# Patient Record
Sex: MALE | Race: BLACK OR AFRICAN AMERICAN | NOT HISPANIC OR LATINO | Employment: OTHER | URBAN - METROPOLITAN AREA
[De-identification: names, ages, dates, MRNs, and addresses within clinical notes are randomized per-mention and may not be internally consistent; named-entity substitution may affect disease eponyms.]

---

## 2018-02-15 ENCOUNTER — OFFICE VISIT CONVERTED (OUTPATIENT)
Dept: INTERNAL MEDICINE | Facility: CLINIC | Age: 48
End: 2018-02-15
Attending: INTERNAL MEDICINE

## 2018-08-02 ENCOUNTER — OFFICE VISIT CONVERTED (OUTPATIENT)
Dept: INTERNAL MEDICINE | Facility: CLINIC | Age: 48
End: 2018-08-02
Attending: INTERNAL MEDICINE

## 2021-05-11 ENCOUNTER — CONVERSION ENCOUNTER (OUTPATIENT)
Dept: INTERNAL MEDICINE | Facility: CLINIC | Age: 51
End: 2021-05-11

## 2021-05-11 ENCOUNTER — OFFICE VISIT CONVERTED (OUTPATIENT)
Dept: INTERNAL MEDICINE | Facility: CLINIC | Age: 51
End: 2021-05-11
Attending: INTERNAL MEDICINE

## 2021-05-16 VITALS
HEART RATE: 64 BPM | RESPIRATION RATE: 16 BRPM | BODY MASS INDEX: 28.36 KG/M2 | SYSTOLIC BLOOD PRESSURE: 120 MMHG | DIASTOLIC BLOOD PRESSURE: 71 MMHG | TEMPERATURE: 98 F | OXYGEN SATURATION: 100 % | HEIGHT: 69 IN | WEIGHT: 191.5 LBS

## 2021-05-16 VITALS
TEMPERATURE: 97.4 F | WEIGHT: 203 LBS | SYSTOLIC BLOOD PRESSURE: 122 MMHG | OXYGEN SATURATION: 96 % | HEART RATE: 100 BPM | DIASTOLIC BLOOD PRESSURE: 84 MMHG

## 2021-06-01 ENCOUNTER — TRANSCRIBE ORDERS (OUTPATIENT)
Dept: ADMINISTRATIVE | Facility: HOSPITAL | Age: 51
End: 2021-06-01

## 2021-06-01 DIAGNOSIS — K11.7 HYPOSALIVATION: ICD-10-CM

## 2021-06-01 DIAGNOSIS — R05.9 COUGHING: Primary | ICD-10-CM

## 2021-06-05 NOTE — H&P
"   History and Physical      Patient Name: Luis Evans   Patient ID: [id number]   Sex: Male   YOB: 1970    Primary Care Provider: Leonardo Barraza MD    Visit Date: May 11, 2021    Provider: Leonardo Barraza MD   Location: [facility] [place]   Location Address: [address] [place] [address]; [address]  [place], [place]  [address]   Location Phone: [phone]          Chief Complaint  · \"RE-establish care\"  · \"Was Pt of Dr. Linton\"  · \" Occasional coughing every time drinks liquid\"      History Of Present Illness  Luis Evans is a 50 year old /Black male who presents for evaluation and treatment of:      constipation- mom reports pt hurts in RLQ intermittently x2-3 years. pt is currently on miralax to help alleviate constipation.   due for colonoscopy  mom also reports pt coughs every time he drinks. cough also seems worse when he lays down at night. glycopyrollate is not helping with drooling like it once did. pt without fevers.       Allergy List    Allergies Reconciled  Vitals  Date Time BP Position Site L\R Cuff Size HR RR TEMP (F) WT  HT  BMI kg/m2 BSA m2 O2 Sat FR L/min FiO2 HC       02/15/2018 03:38 /84 Sitting    100 - R  97.4 203lbs 0oz    96 %      08/02/2018 03:50 /71 Sitting    64 - R 16 98 191lbs 8oz 5'  9\" 28.28 2.06 100 %      05/11/2021 10:15 /83 Sitting    100 - R  97.1 218lbs 4oz 5'  9\" 32.23 2.2 92 %  21%          Physical Examination  · Constitutional  o Appearance  o : no acute distress, well-nourished  · Head and Face  o Head  o :   § Inspection  § : atraumatic, normocephalic  · Eyes  o Eyes  o : extraocular movements intact, no scleral icterus, no conjunctival injection  · Ears, Nose, Mouth and Throat  o Ears  o :   § External Ears  § : normal  o Nose  o :   § Intranasal Exam  § : nares patent  o Oral Cavity  o :   § Oral Mucosa  § : moist mucous membranes  · Respiratory  o Respiratory Effort  o : breathing " comfortably, symmetric chest rise  o Auscultation of Lungs  o : clear to asculatation bilaterally, no wheezes, rales, or rhonchii  · Cardiovascular  o Heart  o :   § Auscultation of Heart  § : regular rate and rhythm, no murmurs, rubs, or gallops  o Peripheral Vascular System  o :   § Extremities  § : no edema  · Neurologic  o Mental Status Examination  o :   § Orientation  § : grossly oriented to person, place and time  o Gait and Station  o :   § Gait Screening  § : normal gait  · Psychiatric  o General  o : normal mood and affect          Assessment  · Cough     786.2/R05  given a/w feeds, will obtain swallow study to eval for aspiration. may also be related to reflux or RAD. if swallow study normal, consider addition of reflux medication.   · Drooling     527.7/K11.7  inc glycopyrrolate to 2mg TID and monitor for effectiveness.     Problems Reconciled  Plan  · Orders  o ACO-39: Current medications updated and reviewed (1159F, ) - - 05/11/2021  o Swallow study (18268) - 786.2/R05, 527.7/K11.7 - 05/11/2021  · Medications  o Medications have been Reconciled  o Transition of Care or Provider Policy  · Instructions  o Patient was educated/instructed on their diagnosis, treatment and medications prior to discharge from the clinic today.  · Disposition  o Call or Return if symptoms worsen or persist.            Electronically Signed by: Leonardo Barraza MD -Author on May 11, 2021 01:22:51 PM

## 2021-06-08 ENCOUNTER — HOSPITAL ENCOUNTER (OUTPATIENT)
Dept: GENERAL RADIOLOGY | Facility: HOSPITAL | Age: 51
Discharge: HOME | End: 2021-06-08

## 2021-06-08 DIAGNOSIS — R05.9 COUGHING: ICD-10-CM

## 2021-06-08 DIAGNOSIS — R13.10 DYSPHAGIA: ICD-10-CM

## 2021-06-08 DIAGNOSIS — K11.7 HYPOSALIVATION: ICD-10-CM

## 2021-06-08 PROCEDURE — 74230 X-RAY XM SWLNG FUNCJ C+: CPT | Performed by: RADIOLOGY

## 2021-06-08 PROCEDURE — 74230 X-RAY XM SWLNG FUNCJ C+: CPT

## 2021-06-08 PROCEDURE — 63710000001 BARIUM SULFATE 96 % RECONSTITUTED SUSPENSION: Performed by: INTERNAL MEDICINE

## 2021-06-08 PROCEDURE — 92611 MOTION FLUOROSCOPY/SWALLOW: CPT

## 2021-06-08 PROCEDURE — A9270 NON-COVERED ITEM OR SERVICE: HCPCS | Performed by: INTERNAL MEDICINE

## 2021-06-08 RX ADMIN — BARIUM SULFATE 183 ML: 960 POWDER, FOR SUSPENSION ORAL at 12:45

## 2021-06-08 NOTE — THERAPY EVALUATION
Outpatient Modified Barium Swallow -   Speech Language Pathology   Shai               Patient Name: Luis Evans  : 1970  MRN: [id number]  Today's Date: 2021               Admit Date: 2021    Visit Dx:     ICD-10-CM ICD-9-CM   1. Coughing  R05 786.2   2. Hyposalivation  K11.7 527.7     There is no problem list on file for this patient.    No past medical history on file.  No past surgical history on file.     MODIFIED BARIUM SWALLOW STUDY: SPEECH PATHOLOGY REPORT        DATE OF SERVICE: 2021    PERTINENT INFORMATION:  This patient is a 50 year old male referred for modified barium swallow study due to report of coughing with liquids and coughing at night.  Patient with intellectual impairment.  Accompanied by his mother who reported history of anxiety but was unable to report any other significant prior medical history.    Patient was referred for an MBSS by Dr. Barraza to rule out aspiration as well as to determine appropriate treatment plan for this patient.      PROCEDURE:    Patient was alert and cooperative.  The patient was viewed in lateral projection.  The following Ba consistencies were administered: Thin liquids from straw, purée consistencies and solids.  The following compensatory swallowing strategies were performed: N/A      RESULTS:    1.  Limited study due to patient compliance.  Oral stage is characterized by adequate bolus preparation, mildly reduced bolus control.  Some anterior loss with thin liquids.  Appears to have timely oral transit.  Disorganized swallow.  Pharyngeal phase appears timely with good laryngeal elevation, base of tongue retraction and epiglottic retroflexion.  Overall poorly coordinated swallow however no penetration or aspiration noted with any trial presented.  No pharyngeal residue noted after the swallow.      IMPRESSIONS:    Patient demonstrated functional oropharyngeal swallow however poorly coordinated.  No penetration or aspiration noted.  No  pharyngeal residue noted after the swallow.     FUNCTIONAL DEFICIT: Patient scored level 6 of 7 on Functional Communication Measures for swallowing indicating a 1-19% limitation in function for current status, goal status, and discharge status.      RECOMMENDATIONS:   1.  Regular diet and thin liquids  2.  Small bites  3.  Small drinks  4.  Upright for all intake    Yes patient/responsible party agrees with the plan of care and has been informed of all alternatives, risks and benefits.    Thank you for this referral.    EDUCATION  The patient has been educated in the following areas:   Results and recommendations d/w mother who accompanied patient..                                                                      Time Calculation:       Therapy Charges for Today     Code Description Service Date Service Provider Modifiers Qty    [id number] HC ST MOTION FLUORO EVAL SWALLOW 6 6/8/2021 Mary rGey, SLP GN 1               Mary Grey SLP  6/8/2021

## 2021-07-15 VITALS
TEMPERATURE: 97.1 F | BODY MASS INDEX: 32.33 KG/M2 | DIASTOLIC BLOOD PRESSURE: 83 MMHG | WEIGHT: 218.25 LBS | OXYGEN SATURATION: 92 % | HEART RATE: 100 BPM | SYSTOLIC BLOOD PRESSURE: 123 MMHG | HEIGHT: 69 IN

## 2021-08-27 ENCOUNTER — OFFICE VISIT (OUTPATIENT)
Dept: INTERNAL MEDICINE | Facility: CLINIC | Age: 51
End: 2021-08-27

## 2021-08-27 VITALS
SYSTOLIC BLOOD PRESSURE: 122 MMHG | WEIGHT: 213.4 LBS | HEIGHT: 69 IN | HEART RATE: 99 BPM | OXYGEN SATURATION: 92 % | TEMPERATURE: 97.2 F | BODY MASS INDEX: 31.61 KG/M2 | DIASTOLIC BLOOD PRESSURE: 81 MMHG

## 2021-08-27 DIAGNOSIS — K11.7 DROOLING: ICD-10-CM

## 2021-08-27 DIAGNOSIS — K59.00 CONSTIPATION, UNSPECIFIED CONSTIPATION TYPE: ICD-10-CM

## 2021-08-27 DIAGNOSIS — Z79.899 ENCOUNTER FOR LONG-TERM (CURRENT) USE OF OTHER MEDICATIONS: ICD-10-CM

## 2021-08-27 DIAGNOSIS — F41.9 ANXIETY: Primary | ICD-10-CM

## 2021-08-27 PROBLEM — K58.9 IRRITABLE BOWEL SYNDROME: Status: ACTIVE | Noted: 2019-05-15

## 2021-08-27 PROBLEM — R15.9 ENCOPRESIS: Status: ACTIVE | Noted: 2019-01-10

## 2021-08-27 PROBLEM — F79 INTELLECTUAL FUNCTIONING DISABILITY: Status: ACTIVE | Noted: 2019-01-10

## 2021-08-27 LAB
AMPHET+METHAMPHET UR QL: NEGATIVE
AMPHETAMINE INTERNAL CONTROL: ABNORMAL
AMPHETAMINES UR QL: NEGATIVE
BARBITURATE INTERNAL CONTROL: ABNORMAL
BARBITURATES UR QL SCN: NEGATIVE
BENZODIAZ UR QL SCN: POSITIVE
BENZODIAZEPINE INTERNAL CONTROL: ABNORMAL
BUPRENORPHINE INTERNAL CONTROL: ABNORMAL
BUPRENORPHINE SERPL-MCNC: NEGATIVE NG/ML
CANNABINOIDS SERPL QL: NEGATIVE
COCAINE INTERNAL CONTROL: ABNORMAL
COCAINE UR QL: NEGATIVE
EXPIRATION DATE: ABNORMAL
Lab: ABNORMAL
MDMA (ECSTASY) INTERNAL CONTROL: ABNORMAL
MDMA UR QL SCN: NEGATIVE
METHADONE INTERNAL CONTROL: ABNORMAL
METHADONE UR QL SCN: NEGATIVE
METHAMPHETAMINE INTERNAL CONTROL: ABNORMAL
OPIATES INTERNAL CONTROL: ABNORMAL
OPIATES UR QL: NEGATIVE
OXYCODONE INTERNAL CONTROL: ABNORMAL
OXYCODONE UR QL SCN: NEGATIVE
PCP UR QL SCN: NEGATIVE
PHENCYCLIDINE INTERNAL CONTROL: ABNORMAL
THC INTERNAL CONTROL: ABNORMAL

## 2021-08-27 PROCEDURE — 80305 DRUG TEST PRSMV DIR OPT OBS: CPT | Performed by: INTERNAL MEDICINE

## 2021-08-27 PROCEDURE — 99213 OFFICE O/P EST LOW 20 MIN: CPT | Performed by: INTERNAL MEDICINE

## 2021-08-27 RX ORDER — MELOXICAM 15 MG/1
15 TABLET ORAL DAILY
Qty: 90 TABLET | Refills: 0 | Status: SHIPPED | OUTPATIENT
Start: 2021-08-27 | End: 2021-11-30 | Stop reason: SDUPTHER

## 2021-08-27 RX ORDER — GLYCOPYRROLATE 2 MG/1
2 TABLET ORAL 3 TIMES DAILY
Qty: 360 TABLET | Refills: 1 | Status: SHIPPED | OUTPATIENT
Start: 2021-08-27 | End: 2021-11-30 | Stop reason: SDUPTHER

## 2021-08-27 RX ORDER — POLYETHYLENE GLYCOL 3350 17 G/17G
POWDER, FOR SOLUTION ORAL
COMMUNITY
End: 2021-08-27 | Stop reason: SDUPTHER

## 2021-08-27 RX ORDER — POLYETHYLENE GLYCOL 3350 17 G/17G
17 POWDER, FOR SOLUTION ORAL DAILY PRN
Qty: 850 G | Refills: 1 | Status: SHIPPED | OUTPATIENT
Start: 2021-08-27 | End: 2022-03-28 | Stop reason: SDUPTHER

## 2021-08-27 RX ORDER — RISPERIDONE 2 MG/1
2 TABLET ORAL DAILY
Qty: 90 TABLET | Refills: 3 | Status: SHIPPED | OUTPATIENT
Start: 2021-08-27 | End: 2021-11-30 | Stop reason: SDUPTHER

## 2021-08-27 RX ORDER — GLYCOPYRROLATE 1 MG/1
TABLET ORAL
COMMUNITY
End: 2021-08-27 | Stop reason: ALTCHOICE

## 2021-08-27 RX ORDER — RISPERIDONE 2 MG/1
TABLET ORAL
COMMUNITY
End: 2021-08-27 | Stop reason: SDUPTHER

## 2021-08-27 RX ORDER — GLYCOPYRROLATE 1 MG/1
1 TABLET ORAL 2 TIMES DAILY
Qty: 90 TABLET | Refills: 0 | Status: CANCELLED | OUTPATIENT
Start: 2021-08-27

## 2021-08-27 RX ORDER — ALPRAZOLAM 1 MG/1
TABLET ORAL
COMMUNITY
End: 2021-08-27 | Stop reason: SDUPTHER

## 2021-08-27 RX ORDER — MELOXICAM 15 MG/1
TABLET ORAL
COMMUNITY
Start: 2021-05-11 | End: 2021-08-27 | Stop reason: SDUPTHER

## 2021-08-27 RX ORDER — ALPRAZOLAM 1 MG/1
1 TABLET ORAL 3 TIMES DAILY PRN
Qty: 60 TABLET | Refills: 0 | Status: SHIPPED | OUTPATIENT
Start: 2021-08-27 | End: 2021-11-29

## 2021-08-27 NOTE — PROGRESS NOTES
"Chief Complaint  Anxiety (always rubbing his leg ), Med Refill, and Chest Pain (pt keeps hitting his chest )    Subjective          Luis Evans presents to [facility]  History of Present Illness  Pt cont to drool a lot. Unsure if pt is having pain. Pt struggles to see a dentist.    Pt is doing well with behaviors. risperdal seems to helping.   Anxiety - pt is on xanax to help calm him down. Pt has been on medication for years. Pt previously saw Dr. Lundberg who wrote medication. Pt previously on medication in [place].     Objective   Vital Signs:   /81 (BP Location: Left arm, Patient Position: Sitting, Cuff Size: Large Adult)   Pulse 99   Temp 97.2 °F (36.2 °C) (Temporal)   Ht 175.3 cm (69\")   Wt 96.8 kg (213 lb 6.4 oz)   SpO2 92%   BMI 31.51 kg/m²     Physical Exam   Appearance: No acute distress, well-nourished  Head: normocephalic, atraumatic  Eyes: extraocular movements intact, no scleral icterus, no conjunctival injection  Ears, Nose, and Throat: external ears normal, nares patent, moist mucous membranes  Cardiovascular: regular rate and rhythm. no murmurs, rales, or rhonchi. no edema  Respiratory: breathing comfortably, symmetric chest rise, clear to auscultation bilaterally. No wheezes, rales, or rhonchi.  Neuro: alert and oriented to time, place, and person. Normal gait  Psych: normal mood and affect        Assessment and Plan    Diagnoses and all orders for this visit:    1. Anxiety (Primary)  -     ALPRAZolam (XANAX) 1 MG tablet; Take 1 tablet by mouth 3 (Three) Times a Day As Needed for Anxiety.  Dispense: 60 tablet; Refill: 0  -     risperiDONE (risperDAL) 2 MG tablet; Take 1 tablet by mouth Daily.  Dispense: 90 tablet; Refill: 3    2. Drooling  -     glycopyrrolate (ROBINUL) 2 MG tablet; Take 1 tablet by mouth 3 (Three) Times a Day.  Dispense: 360 tablet; Refill: 1    3. Constipation, unspecified constipation type  -     polyethylene glycol " (MIRALAX) 17 GM/SCOOP powder; Take 17 g by mouth Daily As Needed (for constipation).  Dispense: 850 g; Refill: 1    4. Encounter for long-term (current) use of other medications  -     POC Urine Drug Screen Premier Bio-Cup    Other orders  -     Cancel: glycopyrrolate (ROBINUL) 1 MG tablet; Take 1 tablet by mouth 2 (Two) Times a Day.  Dispense: 90 tablet; Refill: 0  -     meloxicam (MOBIC) 15 MG tablet; Take 1 tablet by mouth Daily.  Dispense: 90 tablet; Refill: 0      Follow Up   Return in about 3 months (around 11/27/2021).  Patient was given instructions and counseling regarding his condition or for health maintenance advice. Please see specific information pulled into the AVS if appropriate.

## 2021-08-31 ENCOUNTER — TELEPHONE (OUTPATIENT)
Dept: INTERNAL MEDICINE | Facility: CLINIC | Age: 51
End: 2021-08-31

## 2021-09-22 ENCOUNTER — TELEPHONE (OUTPATIENT)
Dept: INTERNAL MEDICINE | Facility: CLINIC | Age: 51
End: 2021-09-22

## 2021-09-22 NOTE — TELEPHONE ENCOUNTER
Caller: ROSLYN    Relationship: Other    Best call back number:[phone]    What is the best time to reach you: ANYTIME    Who are you requesting to speak with (clinical staff, provider,  specific staff member): DR JACOBSON    Do you know the name of the person who called:     What was the call regarding: ALFRED WITH [employer] STATES THAT THE MAP 10 APPLICATION IS MISSING THE DIAGNOSIS CODE.   FAX- [phone]    Do you require a callback: NO

## 2021-09-24 NOTE — TELEPHONE ENCOUNTER
PALLAVI BRUNO [facility]    VERIFIED     LEFT MESSAGE FOR FERNANDO     DIAGNOSTIC CODE WAS F78 INTELLECTUAL FUNCTIONING DISABILITY

## 2021-11-25 DIAGNOSIS — F41.9 ANXIETY: ICD-10-CM

## 2021-11-29 RX ORDER — ALPRAZOLAM 1 MG/1
TABLET ORAL
Qty: 60 TABLET | Refills: 0 | Status: SHIPPED | OUTPATIENT
Start: 2021-11-29 | End: 2021-11-30 | Stop reason: SDUPTHER

## 2021-11-30 ENCOUNTER — OFFICE VISIT (OUTPATIENT)
Dept: INTERNAL MEDICINE | Facility: CLINIC | Age: 51
End: 2021-11-30

## 2021-11-30 VITALS
HEIGHT: 69 IN | TEMPERATURE: 98.5 F | WEIGHT: 206.4 LBS | BODY MASS INDEX: 30.57 KG/M2 | DIASTOLIC BLOOD PRESSURE: 91 MMHG | HEART RATE: 95 BPM | SYSTOLIC BLOOD PRESSURE: 152 MMHG | OXYGEN SATURATION: 95 %

## 2021-11-30 DIAGNOSIS — K11.7 DROOLING: ICD-10-CM

## 2021-11-30 DIAGNOSIS — K04.7 DENTAL INFECTION: ICD-10-CM

## 2021-11-30 DIAGNOSIS — F41.9 ANXIETY: Primary | ICD-10-CM

## 2021-11-30 DIAGNOSIS — Z12.11 COLON CANCER SCREENING: ICD-10-CM

## 2021-11-30 PROCEDURE — 99214 OFFICE O/P EST MOD 30 MIN: CPT | Performed by: INTERNAL MEDICINE

## 2021-11-30 RX ORDER — MELOXICAM 15 MG/1
15 TABLET ORAL DAILY
Qty: 90 TABLET | Refills: 0 | Status: SHIPPED | OUTPATIENT
Start: 2021-11-30 | End: 2022-03-28 | Stop reason: SDUPTHER

## 2021-11-30 RX ORDER — RISPERIDONE 2 MG/1
2 TABLET ORAL DAILY
Qty: 90 TABLET | Refills: 3 | Status: SHIPPED | OUTPATIENT
Start: 2021-11-30 | End: 2022-03-28 | Stop reason: SDUPTHER

## 2021-11-30 RX ORDER — ALPRAZOLAM 1 MG/1
1 TABLET ORAL 3 TIMES DAILY PRN
Qty: 90 TABLET | Refills: 0 | Status: SHIPPED | OUTPATIENT
Start: 2021-11-30 | End: 2022-03-21

## 2021-11-30 RX ORDER — AMOXICILLIN 500 MG/1
500 CAPSULE ORAL 2 TIMES DAILY
Qty: 20 CAPSULE | Refills: 0 | Status: SHIPPED | OUTPATIENT
Start: 2021-11-30 | End: 2021-12-10

## 2021-11-30 RX ORDER — AMOXICILLIN 500 MG/1
1000 CAPSULE ORAL 2 TIMES DAILY
Qty: 40 CAPSULE | Refills: 0 | Status: SHIPPED | OUTPATIENT
Start: 2021-11-30 | End: 2021-11-30

## 2021-11-30 RX ORDER — GLYCOPYRROLATE 2 MG/1
2 TABLET ORAL 3 TIMES DAILY
Qty: 270 TABLET | Refills: 3 | Status: SHIPPED | OUTPATIENT
Start: 2021-11-30 | End: 2022-03-28 | Stop reason: SDUPTHER

## 2021-12-02 PROBLEM — K11.7 DROOLING: Status: ACTIVE | Noted: 2021-12-02

## 2022-03-20 DIAGNOSIS — F41.9 ANXIETY: ICD-10-CM

## 2022-03-21 RX ORDER — ALPRAZOLAM 1 MG/1
TABLET ORAL
Qty: 90 TABLET | Refills: 0 | Status: SHIPPED | OUTPATIENT
Start: 2022-03-21 | End: 2022-03-28 | Stop reason: SDUPTHER

## 2022-03-28 ENCOUNTER — OFFICE VISIT (OUTPATIENT)
Dept: INTERNAL MEDICINE | Facility: CLINIC | Age: 52
End: 2022-03-28

## 2022-03-28 VITALS
DIASTOLIC BLOOD PRESSURE: 80 MMHG | WEIGHT: 203.8 LBS | HEIGHT: 69 IN | SYSTOLIC BLOOD PRESSURE: 129 MMHG | HEART RATE: 100 BPM | OXYGEN SATURATION: 95 % | BODY MASS INDEX: 30.18 KG/M2 | TEMPERATURE: 97.5 F

## 2022-03-28 DIAGNOSIS — F41.9 ANXIETY: ICD-10-CM

## 2022-03-28 DIAGNOSIS — K11.7 DROOLING: ICD-10-CM

## 2022-03-28 DIAGNOSIS — Z79.899 ENCOUNTER FOR LONG-TERM (CURRENT) USE OF HIGH-RISK MEDICATION: ICD-10-CM

## 2022-03-28 DIAGNOSIS — K59.00 CONSTIPATION, UNSPECIFIED CONSTIPATION TYPE: ICD-10-CM

## 2022-03-28 DIAGNOSIS — K08.89 PAIN, DENTAL: Primary | ICD-10-CM

## 2022-03-28 PROCEDURE — 99214 OFFICE O/P EST MOD 30 MIN: CPT | Performed by: INTERNAL MEDICINE

## 2022-03-28 PROCEDURE — 80305 DRUG TEST PRSMV DIR OPT OBS: CPT | Performed by: INTERNAL MEDICINE

## 2022-03-28 RX ORDER — POLYETHYLENE GLYCOL 3350 17 G/17G
17 POWDER, FOR SOLUTION ORAL DAILY PRN
Qty: 850 G | Refills: 1 | Status: SHIPPED | OUTPATIENT
Start: 2022-03-28

## 2022-03-28 RX ORDER — RISPERIDONE 2 MG/1
2 TABLET ORAL DAILY
Qty: 90 TABLET | Refills: 3 | Status: SHIPPED | OUTPATIENT
Start: 2022-03-28

## 2022-03-28 RX ORDER — ALPRAZOLAM 1 MG/1
1 TABLET ORAL 3 TIMES DAILY PRN
Qty: 90 TABLET | Refills: 0 | Status: SHIPPED | OUTPATIENT
Start: 2022-03-28 | End: 2022-05-19

## 2022-03-28 RX ORDER — GLYCOPYRROLATE 2 MG/1
2 TABLET ORAL 3 TIMES DAILY
Qty: 270 TABLET | Refills: 3 | Status: SHIPPED | OUTPATIENT
Start: 2022-03-28 | End: 2022-12-05 | Stop reason: SDUPTHER

## 2022-03-28 RX ORDER — MELOXICAM 15 MG/1
15 TABLET ORAL DAILY
Qty: 90 TABLET | Refills: 3 | Status: SHIPPED | OUTPATIENT
Start: 2022-03-28

## 2022-03-28 NOTE — PROGRESS NOTES
"Chief Complaint  Follow-up, Anxiety, and Med Refill    Subjective          Luis Evans presents to [facility]  History of Present Illness  Patient's mom reports he is not sleeping very well. Concerned that patient may have dental problem, but he will not allow dentist to evaluate and x-ray him. Patient has been unable to have follow-up evaluate for a special needs clinic.   Anxiety - doing well with xanax and risperdal.   Drooling is doing better with robinul.     Current Outpatient Medications   Medication Instructions   • ALPRAZolam (XANAX) 1 mg, Oral, 3 Times Daily PRN, for anxiety   • glycopyrrolate (ROBINUL) 2 mg, Oral, 3 Times Daily   • meloxicam (MOBIC) 15 mg, Oral, Daily   • polyethylene glycol (MIRALAX) 17 g, Oral, Daily PRN   • risperiDONE (RISPERDAL) 2 mg, Oral, Daily       The following portions of the patient's history were reviewed and updated as appropriate: allergies, current medications, past family history, past medical history, past social history, past surgical history, and problem list.    Objective   Vital Signs:   /80 (BP Location: Left arm, Patient Position: Sitting, Cuff Size: Large Adult)   Pulse 100   Temp 97.5 °F (36.4 °C) (Temporal)   Ht 175.3 cm (69\")   Wt 92.4 kg (203 lb 12.8 oz)   SpO2 95%   BMI 30.10 kg/m²     Wt Readings from Last 3 Encounters:   03/28/22 92.4 kg (203 lb 12.8 oz)   11/30/21 93.6 kg (206 lb 6.4 oz)   08/27/21 96.8 kg (213 lb 6.4 oz)     BP Readings from Last 3 Encounters:   03/28/22 129/80   11/30/21 152/91   08/27/21 122/81     Physical Exam   Appearance: No acute distress, well-nourished  Head: normocephalic, atraumatic  Eyes: extraocular movements intact, no scleral icterus, no conjunctival injection  Ears, Nose, and Throat: external ears normal, nares patent, moist mucous membranes  Cardiovascular: regular rate and rhythm. no murmurs, rales, or rhonchi. no edema  Respiratory: breathing comfortably, " symmetric chest rise, clear to auscultation bilaterally. No wheezes, rales, or rhonchi.  Neuro: alert and oriented to time, place, and person. Normal gait  Psych: normal mood and affect     Result Review :   The following data was reviewed by: Leonardo Barraza Jr, MD on 03/28/2022:    No results found for: SARSANTIGEN, COVID19, RAPFLUA, RAPFLUB, FLUAAG, FLUABDAG, FLUABDAG, FLU, FLU, FLUBAG, RAPSCRN, STREPAAG, RSV, POCPREGUR, MONOSPOT, INR, LEADCAPBLD, POCLEAD, BILIRUBINUR    Last Urine Toxicity     LAST URINE TOXICITY RESULTS Latest Ref Rng & Units 3/28/2022 8/27/2021    AMPHETAMINES SCREEN, URINE Negative Negative Negative    BARBITURATES SCREEN Negative Negative Negative    BENZODIAZEPINE SCREEN, URINE Negative Positive(A) Positive(A)    BUPRENORPHINEUR Negative Negative Negative    COCAINE SCREEN, URINE Negative Negative Negative    METHADONE SCREEN, URINE Negative Negative Negative    METHAMPHETAMINEUR Negative Negative Negative           Assessment and Plan    Diagnoses and all orders for this visit:    1. Pain, dental (Primary)  Comments:  will likely need sedation. refer to [facility] dental school.   Orders:  -     Ambulatory Referral to Dentistry    2. Constipation, unspecified constipation type  -     polyethylene glycol (MIRALAX) 17 GM/SCOOP powder; Take 17 g by mouth Daily As Needed (for constipation).  Dispense: 850 g; Refill: 1    3. Anxiety  Comments:  NELA and ade reviewed. cont regimen with xanax and risperdal.   Orders:  -     risperiDONE (risperDAL) 2 MG tablet; Take 1 tablet by mouth Daily.  Dispense: 90 tablet; Refill: 3  -     ALPRAZolam (XANAX) 1 MG tablet; Take 1 tablet by mouth 3 (Three) Times a Day As Needed for Anxiety. for anxiety  Dispense: 90 tablet; Refill: 0    4. Drooling  -     glycopyrrolate (ROBINUL) 2 MG tablet; Take 1 tablet by mouth 3 (Three) Times a Day.  Dispense: 270 tablet; Refill: 3    5. Encounter for long-term (current) use of high-risk medication  -     POC Urine  Drug Screen [facility] Bio-Cup    Other orders  -     meloxicam (MOBIC) 15 MG tablet; Take 1 tablet by mouth Daily.  Dispense: 90 tablet; Refill: 3          Medications Discontinued During This Encounter   Medication Reason   • polyethylene glycol (MIRALAX) 17 GM/SCOOP powder Reorder   • risperiDONE (risperDAL) 2 MG tablet Reorder   • meloxicam (MOBIC) 15 MG tablet Reorder   • glycopyrrolate (ROBINUL) 2 MG tablet Reorder   • ALPRAZolam (XANAX) 1 MG tablet Reorder        Follow Up   Return in about 4 months (around 7/28/2022) for Recheck.  Patient was given instructions and counseling regarding his condition or for health maintenance advice. Please see specific information pulled into the AVS if appropriate.       Leonardo Barraza Jr, MD  03/28/22  17:27 EDT

## 2022-05-19 DIAGNOSIS — F41.9 ANXIETY: ICD-10-CM

## 2022-05-19 RX ORDER — ALPRAZOLAM 1 MG/1
TABLET ORAL
Qty: 90 TABLET | Refills: 0 | Status: SHIPPED | OUTPATIENT
Start: 2022-05-19 | End: 2022-05-20 | Stop reason: SDUPTHER

## 2022-05-20 DIAGNOSIS — K04.7 DENTAL INFECTION: ICD-10-CM

## 2022-05-20 DIAGNOSIS — F41.9 ANXIETY: ICD-10-CM

## 2022-05-20 RX ORDER — ALPRAZOLAM 1 MG/1
TABLET ORAL
Qty: 90 TABLET | Refills: 0 | Status: SHIPPED | OUTPATIENT
Start: 2022-05-20 | End: 2022-05-24 | Stop reason: SDUPTHER

## 2022-05-20 RX ORDER — AMOXICILLIN 500 MG/1
CAPSULE ORAL
Qty: 20 CAPSULE | Refills: 0 | OUTPATIENT
Start: 2022-05-20

## 2022-05-20 NOTE — TELEPHONE ENCOUNTER
SPOKE WITH PATIENT'S MOTHER SINCE PT IS NON VERBAL    SHE STATED THAT HE IS NOT ON AN ANTIBIOTIC    SHE SAYS HE NEEDS A REFILL OF     ALPRAZolam (XANAX) 1 MG tablet (05/19/2022)    THE PHARMACY MUST HAVE SENT IN THE WRONG REFILL REQUEST.

## 2022-05-20 NOTE — TELEPHONE ENCOUNTER
This is an Antibiotic. What is this regarding usually like to evaluate what we are prescribing Antibiotics for.

## 2022-05-24 ENCOUNTER — TELEPHONE (OUTPATIENT)
Dept: INTERNAL MEDICINE | Facility: CLINIC | Age: 52
End: 2022-05-24

## 2022-05-24 DIAGNOSIS — F41.9 ANXIETY: ICD-10-CM

## 2022-05-24 RX ORDER — ALPRAZOLAM 1 MG/1
1 TABLET ORAL 3 TIMES DAILY PRN
Qty: 90 TABLET | Refills: 0 | Status: SHIPPED | OUTPATIENT
Start: 2022-05-24 | End: 2022-08-02 | Stop reason: SDUPTHER

## 2022-08-02 ENCOUNTER — OFFICE VISIT (OUTPATIENT)
Dept: INTERNAL MEDICINE | Facility: CLINIC | Age: 52
End: 2022-08-02

## 2022-08-02 VITALS
DIASTOLIC BLOOD PRESSURE: 79 MMHG | HEIGHT: 69 IN | OXYGEN SATURATION: 94 % | TEMPERATURE: 96.9 F | HEART RATE: 96 BPM | WEIGHT: 191.8 LBS | SYSTOLIC BLOOD PRESSURE: 123 MMHG | BODY MASS INDEX: 28.41 KG/M2

## 2022-08-02 DIAGNOSIS — Z00.00 ANNUAL PHYSICAL EXAM: Primary | ICD-10-CM

## 2022-08-02 DIAGNOSIS — F41.9 ANXIETY: ICD-10-CM

## 2022-08-02 PROCEDURE — 1159F MED LIST DOCD IN RCRD: CPT | Performed by: INTERNAL MEDICINE

## 2022-08-02 PROCEDURE — 1170F FXNL STATUS ASSESSED: CPT | Performed by: INTERNAL MEDICINE

## 2022-08-02 PROCEDURE — G0439 PPPS, SUBSEQ VISIT: HCPCS | Performed by: INTERNAL MEDICINE

## 2022-08-02 RX ORDER — ALPRAZOLAM 1 MG/1
1 TABLET ORAL 3 TIMES DAILY PRN
Qty: 90 TABLET | Refills: 0 | Status: SHIPPED | OUTPATIENT
Start: 2022-08-02 | End: 2022-10-11

## 2022-08-02 NOTE — PROGRESS NOTES
The ABCs of the Annual Wellness Visit  Subsequent Medicare Wellness Visit    Chief Complaint   Patient presents with   • Anxiety     Follow up    • Med Refill   • Medicare Wellness-subsequent      Subjective    History of Present Illness:  Luis Evans is a 51 y.o. male who presents for a Subsequent Medicare Wellness Visit.    Patient will not allow blood to be drawn to check labs. Patient needed anesthesia last time.     The following portions of the patient's history were reviewed and   updated as appropriate: allergies, current medications, past family history, past medical history, past social history, past surgical history and problem list.    Compared to one year ago, the patient feels his physical   health is the same.    Compared to one year ago, the patient feels his mental   health is the same.    Recent Hospitalizations:  He was not admitted to the hospital during the last year.     Current Medical Providers:  Patient Care Team:  Leonardo Barraza Jr., MD as PCP - General (Internal Medicine)    Outpatient Medications Prior to Visit   Medication Sig Dispense Refill   • glycopyrrolate (ROBINUL) 2 MG tablet Take 1 tablet by mouth 3 (Three) Times a Day. 270 tablet 3   • meloxicam (MOBIC) 15 MG tablet Take 1 tablet by mouth Daily. 90 tablet 3   • polyethylene glycol (MIRALAX) 17 GM/SCOOP powder Take 17 g by mouth Daily As Needed (for constipation). 850 g 1   • risperiDONE (risperDAL) 2 MG tablet Take 1 tablet by mouth Daily. 90 tablet 3   • ALPRAZolam (XANAX) 1 MG tablet Take 1 tablet by mouth 3 (Three) Times a Day As Needed for Anxiety. 90 tablet 0     No facility-administered medications prior to visit.       No opioid medication identified on active medication list. I have reviewed chart for other potential  high risk medication/s and harmful drug interactions in the elderly.        Aspirin is not on active medication list.  Aspirin use is not indicated based on review of current medical  "condition/s. Risk of harm outweighs potential benefits.  .    Patient Active Problem List   Diagnosis   • Anxiety   • Encopresis   • Intellectual functioning disability   • Irritable bowel syndrome   • Drooling           Objective    Vitals:    08/02/22 1559   BP: 123/79   BP Location: Left arm   Pulse: 96   Temp: 96.9 °F (36.1 °C)   TempSrc: Temporal   SpO2: 94%   Weight: 87 kg (191 lb 12.8 oz)   Height: 175.3 cm (69\")     Estimated body mass index is 28.32 kg/m² as calculated from the following:    Height as of this encounter: 175.3 cm (69\").    Weight as of this encounter: 87 kg (191 lb 12.8 oz).    Does the patient have evidence of cognitive impairment? Yes    Physical Exam  Appearance: No acute distress, well-nourished  Head: normocephalic, atraumatic  Eyes: extraocular movements intact, no scleral icterus, no conjunctival injection  Ears, Nose, and Throat: external ears normal, nares patent, moist mucous membranes  Cardiovascular: regular rate and rhythm. no murmurs, rubs, or gallops. no edema  Respiratory: breathing comfortably, symmetric chest rise, clear to auscultation bilaterally. No wheezes, rales, or rhonchi.  Neuro: alert and oriented to time, place, and person. Normal gait  Psych: normal mood and affect     Last Urine Toxicity     LAST URINE TOXICITY RESULTS Latest Ref Rng & Units 3/28/2022 8/27/2021    AMPHETAMINES SCREEN, URINE Negative Negative Negative    BARBITURATES SCREEN Negative Negative Negative    BENZODIAZEPINE SCREEN, URINE Negative Positive(A) Positive(A)    BUPRENORPHINEUR Negative Negative Negative    COCAINE SCREEN, URINE Negative Negative Negative    METHADONE SCREEN, URINE Negative Negative Negative    METHAMPHETAMINEUR Negative Negative Negative          HEALTH RISK ASSESSMENT    Smoking Status:  Social History     Tobacco Use   Smoking Status Never Smoker   Smokeless Tobacco Never Used     Alcohol Consumption:  Social History     Substance and Sexual Activity   Alcohol Use Never "     Fall Risk Screen:    ALICE Fall Risk Assessment has not been completed.    Depression Screening:  PHQ-2/PHQ-9 Depression Screening 8/2/2022   Retired PHQ-9 Total Score -   Retired Total Score -   Little Interest or Pleasure in Doing Things 0-->not at all   Feeling Down, Depressed or Hopeless 0-->not at all   PHQ-9: Brief Depression Severity Measure Score 0       Health Habits and Functional and Cognitive Screening:  Functional & Cognitive Status 8/2/2022   Do you have difficulty preparing food and eating? Yes   Do you have difficulty bathing yourself, getting dressed or grooming yourself? Yes   Do you have difficulty using the toilet? Yes   Do you have difficulty moving around from place to place? No   Do you have trouble with steps or getting out of a bed or a chair? No   Current Diet Well Balanced Diet   Dental Exam Not up to date   Eye Exam Not up to date   Exercise (times per week) 0 times per week   Current Exercises Include No Regular Exercise   Do you need help using the phone?  Yes   Are you deaf or do you have serious difficulty hearing?  No   Do you need help with transportation? Yes   Do you need help shopping? Yes   Do you need help preparing meals?  Yes   Do you need help with housework?  Yes   Do you need help with laundry? Yes   Do you need help taking your medications? Yes   Do you need help managing money? Yes   Do you ever drive or ride in a car without wearing a seat belt? Yes   Have you felt unusual stress, anger or loneliness in the last month? No   Who do you live with? Other   If you need help, do you have trouble finding someone available to you? No   Have you been bothered in the last four weeks by sexual problems? No   Do you have difficulty concentrating, remembering or making decisions? No       Age-appropriate Screening Schedule:  Refer to the list below for future screening recommendations based on patient's age, sex and/or medical conditions. Orders for these recommended tests are  listed in the plan section. The patient has been provided with a written plan.    Health Maintenance   Topic Date Due   • TDAP/TD VACCINES (1 - Tdap) Never done   • ZOSTER VACCINE (1 of 2) Never done   • INFLUENZA VACCINE  10/01/2022              Assessment & Plan   CMS Preventative Services Quick Reference  Risk Factors Identified During Encounter  Dementia/Memory   The above risks/problems have been discussed with the patient.  Follow up actions/plans if indicated are seen below in the Assessment/Plan Section.  Pertinent information has been shared with the patient in the After Visit Summary.    Diagnoses and all orders for this visit:    1. Annual physical exam (Primary)    2. Anxiety  Comments:  NELA and ade reviewed. cont regimen with xanax and risperdal.   Orders:  -     ALPRAZolam (XANAX) 1 MG tablet; Take 1 tablet by mouth 3 (Three) Times a Day As Needed for Anxiety.  Dispense: 90 tablet; Refill: 0        Follow Up:   Return in about 4 months (around 12/2/2022).     An After Visit Summary and PPPS were made available to the patient.

## 2022-10-08 DIAGNOSIS — F41.9 ANXIETY: ICD-10-CM

## 2022-10-10 NOTE — TELEPHONE ENCOUNTER
Refill request for  controlled substance.      Date of request: 10/10/2022   Medication requested: Xanax (Alprazolam)  Last OV: 8/2/2022  Last UDS: 03/28/2022  Contract signed: yes    Date:3/25/2022  Next office visit: 12/05/2022    Adelina Wells

## 2022-10-11 RX ORDER — ALPRAZOLAM 1 MG/1
TABLET ORAL
Qty: 90 TABLET | Refills: 0 | Status: SHIPPED | OUTPATIENT
Start: 2022-10-11 | End: 2022-12-19

## 2022-12-05 ENCOUNTER — OFFICE VISIT (OUTPATIENT)
Dept: INTERNAL MEDICINE | Facility: CLINIC | Age: 52
End: 2022-12-05

## 2022-12-05 VITALS
DIASTOLIC BLOOD PRESSURE: 92 MMHG | HEIGHT: 69 IN | WEIGHT: 180.8 LBS | BODY MASS INDEX: 26.78 KG/M2 | SYSTOLIC BLOOD PRESSURE: 138 MMHG | OXYGEN SATURATION: 95 % | HEART RATE: 109 BPM

## 2022-12-05 DIAGNOSIS — F41.9 ANXIETY: Primary | ICD-10-CM

## 2022-12-05 DIAGNOSIS — K11.7 DROOLING: ICD-10-CM

## 2022-12-05 PROCEDURE — 99214 OFFICE O/P EST MOD 30 MIN: CPT | Performed by: INTERNAL MEDICINE

## 2022-12-05 RX ORDER — GLYCOPYRROLATE 2 MG/1
4 TABLET ORAL 2 TIMES DAILY
Qty: 360 TABLET | Refills: 3 | Status: SHIPPED | OUTPATIENT
Start: 2022-12-05

## 2022-12-05 NOTE — PROGRESS NOTES
"Chief Complaint  Follow-up anxiety    Subjective          Luis Evans presents to [facility]  History of Present Illness  Patient went to [place] for dental evaluation. Patient had dental cleaning. Considering some teeth pull.   Patient continue to have constipation, but miralax works when needed.   Patient is on risperdal and and xanax. Mom reports that he is seemingly controlled. Patient does have some difficulty sleeping. Has not tried melatonin in the past.         Current Outpatient Medications   Medication Instructions   • ALPRAZolam (XANAX) 1 MG tablet TAKE 1 TABLET BY MOUTH THREE TIMES DAILY AS NEEDED FOR ANXIETY   • glycopyrrolate (ROBINUL) 4 mg, Oral, 2 Times Daily   • Melatonin 3 mg, Oral, Nightly   • meloxicam (MOBIC) 15 mg, Oral, Daily   • polyethylene glycol (MIRALAX) 17 g, Oral, Daily PRN   • risperiDONE (RISPERDAL) 2 mg, Oral, Daily       The following portions of the patient's history were reviewed and updated as appropriate: allergies, current medications, past family history, past medical history, past social history, past surgical history, and problem list.    Objective   Vital Signs:   /92 (BP Location: Right arm, Patient Position: Sitting)   Pulse 109   Ht 175.3 cm (69\")   Wt 82 kg (180 lb 12.8 oz)   SpO2 95%   BMI 26.70 kg/m²     Wt Readings from Last 3 Encounters:   12/05/22 82 kg (180 lb 12.8 oz)   08/02/22 87 kg (191 lb 12.8 oz)   03/28/22 92.4 kg (203 lb 12.8 oz)     BP Readings from Last 3 Encounters:   12/05/22 138/92   08/02/22 123/79   03/28/22 129/80     Physical Exam   Appearance: No acute distress, well-nourished  Head: normocephalic, atraumatic  Eyes: extraocular movements intact, no scleral icterus, no conjunctival injection  Ears, Nose, and Throat: external ears normal, nares patent, moist mucous membranes  Cardiovascular: regular rate and rhythm. no murmurs, rubs, or gallops. no edema  Respiratory: breathing " comfortably, symmetric chest rise, clear to auscultation bilaterally. No wheezes, rales, or rhonchi.  Neuro: alert and oriented to time, place, and person. Normal gait  Psych: normal mood and affect     Result Review :   The following data was reviewed by: Leonardo Barraza Jr, MD on 12/05/2022:      No results found for: SARSANTIGEN, COVID19, RAPFLUA, RAPFLUB, FLUAAG, FLUABDAG, FLUABDAG, FLU, FLU, FLUBAG, RAPSCRN, STREPAAG, RSV, POCPREGUR, MONOSPOT, INR, LEADCAPBLD, POCLEAD, BILIRUBINUR         Assessment and Plan    Diagnoses and all orders for this visit:    1. Anxiety (Primary)  Comments:  cont regimen. add melatonin to help with sleep. call or RTC with concerns.   Orders:  -     Melatonin 3 MG capsule; Take 1 capsule by mouth Every Night.  Dispense: 90 capsule; Refill: 1    2. Drooling  Comments:  seemingly worse recently. cont f/u with dentistry. will also inc robinul to help.   Orders:  -     glycopyrrolate (ROBINUL) 2 MG tablet; Take 2 tablets by mouth 2 (Two) Times a Day.  Dispense: 360 tablet; Refill: 3          Medications Discontinued During This Encounter   Medication Reason   • glycopyrrolate (ROBINUL) 2 MG tablet Reorder          Follow Up   Return in about 4 months (around 4/5/2023) for Recheck.  Patient was given instructions and counseling regarding his condition or for health maintenance advice. Please see specific information pulled into the AVS if appropriate.       Leonardo Barraza Jr, MD  12/05/22  17:02 EST

## 2022-12-11 DIAGNOSIS — K11.7 DROOLING: ICD-10-CM

## 2022-12-12 RX ORDER — GLYCOPYRROLATE 2 MG/1
TABLET ORAL
Qty: 270 TABLET | OUTPATIENT
Start: 2022-12-12

## 2022-12-18 DIAGNOSIS — F41.9 ANXIETY: ICD-10-CM

## 2022-12-19 RX ORDER — ALPRAZOLAM 1 MG/1
TABLET ORAL
Qty: 90 TABLET | Refills: 0 | Status: SHIPPED | OUTPATIENT
Start: 2022-12-19 | End: 2023-01-24

## 2023-01-23 DIAGNOSIS — F41.9 ANXIETY: ICD-10-CM

## 2023-01-23 NOTE — TELEPHONE ENCOUNTER
CONTROLLED MEDICATION. PLEASE ADVISE.     Xanax 1mg    Last Filled: 12/19/22 #90-no refills  Last Visit: 12/5/22  Next Appt: not scheduled   Last UDS: 3/28/22  Last Controlled Contract: 3/25/22

## 2023-01-24 RX ORDER — ALPRAZOLAM 1 MG/1
TABLET ORAL
Qty: 90 TABLET | Refills: 0 | Status: SHIPPED | OUTPATIENT
Start: 2023-01-24 | End: 2023-03-06

## 2023-03-06 DIAGNOSIS — F41.9 ANXIETY: ICD-10-CM

## 2023-03-06 RX ORDER — ALPRAZOLAM 1 MG/1
TABLET ORAL
Qty: 90 TABLET | Refills: 0 | Status: SHIPPED | OUTPATIENT
Start: 2023-03-06

## 2023-03-06 NOTE — TELEPHONE ENCOUNTER
Last Office Visit: 12/05/2022  Next Office Visit: NOT SCHEDULED   Last Date Prescribed: 01/24/2023  Last Urine Drug Screen: 03/28/2022  Date of Signed Controlled Contract: 03/25/2022

## 2023-05-17 NOTE — PROGRESS NOTES
"Chief Complaint  Med Refill and Anxiety    Subjective          Luis Evans presents to [facility]  History of Present Illness  Anxiety- patient is doing alprazolam at least BID, sometimes TID. Patient has not been on clonazepam previously, but caregiver is amenable.   Drooling- patient is on robinul with some help.     Current Outpatient Medications   Medication Instructions   • ALPRAZolam (XANAX) 1 MG tablet TAKE 1 TABLET BY MOUTH THREE TIMES DAILY AS NEEDED FOR ANXIETY   • clonazePAM (KLONOPIN) 1 mg, Oral, 2 Times Daily PRN   • glycopyrrolate (ROBINUL) 4 mg, Oral, 2 Times Daily   • Melatonin 3 mg, Oral, Nightly   • meloxicam (MOBIC) 15 mg, Oral, Daily PRN   • polyethylene glycol (MIRALAX) 17 g, Oral, Daily PRN   • risperiDONE (RISPERDAL) 2 mg, Oral, Daily       The following portions of the patient's history were reviewed and updated as appropriate: allergies, current medications, past family history, past medical history, past social history, past surgical history, and problem list.    Objective   Vital Signs:   /72 (BP Location: Left arm, Patient Position: Sitting, Cuff Size: Adult)   Pulse 85   Temp 97.8 °F (36.6 °C) (Temporal)   Ht 175.3 cm (69\")   Wt 72 kg (158 lb 12.8 oz)   SpO2 98%   BMI 23.45 kg/m²     Wt Readings from Last 3 Encounters:   05/18/23 72 kg (158 lb 12.8 oz)   12/05/22 82 kg (180 lb 12.8 oz)   08/02/22 87 kg (191 lb 12.8 oz)     BP Readings from Last 3 Encounters:   05/18/23 106/72   12/05/22 138/92   08/02/22 123/79     Physical Exam   Appearance: No acute distress, well-nourished  Head: normocephalic, atraumatic  Eyes: extraocular movements intact, no scleral icterus, no conjunctival injection  Ears, Nose, and Throat: external ears normal, nares patent, moist mucous membranes  Cardiovascular: regular rate and rhythm. no murmurs, rubs, or gallops. no edema  Respiratory: breathing comfortably, symmetric chest rise, clear to " auscultation bilaterally. No wheezes, rales, or rhonchi.  Neuro: alert and oriented to time, place, and person. Normal gait  Psych: normal mood and affect     Result Review :   The following data was reviewed by: Leonardo Barraza Jr, MD on 05/18/2023:        No results found for: SARSANTIGEN, COVID19, RAPFLUA, RAPFLUB, FLUAAG, FLUABDAG, FLUABDAG, FLU, FLU, FLUBAG, RAPSCRN, STREPAAG, RSV, POCPREGUR, MONOSPOT, INR, LEADCAPBLD, POCLEAD, BILIRUBINUR    Last Urine Toxicity         Latest Ref Rng & Units 5/18/2023 3/28/2022   LAST URINE TOXICITY RESULTS   Amphetamine, Urine Qual Negative Negative   Negative     Barbiturates Screen, Urine Negative Negative   Negative     Benzodiazepine Screen, Urine Negative Positive   Positive     Buprenorphine, Screen, Urine Negative Negative   Negative     Cocaine Screen, Urine Negative Negative   Negative     Methadone Screen , Urine Negative Negative   Negative     Methamphetamine, Ur Negative Negative   Negative                    Assessment and Plan    Diagnoses and all orders for this visit:    1. Anxiety (Primary)  Comments:  switch from xanax to klonopin for longer acting effects. call or RTC with concerns. f/u in 3 months. NELA and ade reviewed   Orders:  -     POC Urine Drug Screen [facility]  -     risperiDONE (risperDAL) 2 MG tablet; Take 1 tablet by mouth Daily.  Dispense: 90 tablet; Refill: 3  -     clonazePAM (KlonoPIN) 1 MG tablet; Take 1 tablet by mouth 2 (Two) Times a Day As Needed for Anxiety.  Dispense: 60 tablet; Refill: 0    2. Encounter for long-term (current) use of high-risk medication  -     POC Urine Drug Screen [facility]    3. Drooling  Comments:  robinul is working at [employer].   Orders:  -     glycopyrrolate (ROBINUL) 2 MG tablet; Take 2 tablets by mouth 2 (Two) Times a Day.  Dispense: 360 tablet; Refill: 3    4. Constipation, unspecified constipation type  -     polyethylene glycol (MIRALAX) 17 GM/SCOOP powder; Take 17 g by mouth Daily  As Needed (for constipation).  Dispense: 850 g; Refill: 1    Other orders  -     Melatonin 3 MG capsule; Take 1 capsule by mouth Every Night.  Dispense: 90 capsule; Refill: 3  -     meloxicam (MOBIC) 15 MG tablet; Take 1 tablet by mouth Daily As Needed for Moderate Pain.  Dispense: 90 tablet; Refill: 3          Medications Discontinued During This Encounter   Medication Reason   • meloxicam (MOBIC) 15 MG tablet Reorder   • polyethylene glycol (MIRALAX) 17 GM/SCOOP powder Reorder   • risperiDONE (risperDAL) 2 MG tablet Reorder   • glycopyrrolate (ROBINUL) 2 MG tablet Reorder   • Melatonin 3 MG capsule Reorder          Follow Up   Return in about 3 months (around 8/18/2023) for Recheck.  Patient was given instructions and counseling regarding his condition or for health maintenance advice. Please see specific information pulled into the AVS if appropriate.       Leonardo Barraza Jr, MD  05/18/23  11:28 EDT

## 2023-05-18 ENCOUNTER — OFFICE VISIT (OUTPATIENT)
Dept: INTERNAL MEDICINE | Facility: CLINIC | Age: 53
End: 2023-05-18
Payer: MEDICARE

## 2023-05-18 VITALS
TEMPERATURE: 97.8 F | BODY MASS INDEX: 23.52 KG/M2 | HEIGHT: 69 IN | OXYGEN SATURATION: 98 % | WEIGHT: 158.8 LBS | HEART RATE: 85 BPM | DIASTOLIC BLOOD PRESSURE: 72 MMHG | SYSTOLIC BLOOD PRESSURE: 106 MMHG

## 2023-05-18 DIAGNOSIS — F41.9 ANXIETY: Primary | ICD-10-CM

## 2023-05-18 DIAGNOSIS — K11.7 DROOLING: ICD-10-CM

## 2023-05-18 DIAGNOSIS — Z79.899 ENCOUNTER FOR LONG-TERM (CURRENT) USE OF HIGH-RISK MEDICATION: ICD-10-CM

## 2023-05-18 DIAGNOSIS — K59.00 CONSTIPATION, UNSPECIFIED CONSTIPATION TYPE: ICD-10-CM

## 2023-05-18 PROCEDURE — 80305 DRUG TEST PRSMV DIR OPT OBS: CPT | Performed by: INTERNAL MEDICINE

## 2023-05-18 PROCEDURE — 1159F MED LIST DOCD IN RCRD: CPT | Performed by: INTERNAL MEDICINE

## 2023-05-18 PROCEDURE — 1160F RVW MEDS BY RX/DR IN RCRD: CPT | Performed by: INTERNAL MEDICINE

## 2023-05-18 PROCEDURE — 99214 OFFICE O/P EST MOD 30 MIN: CPT | Performed by: INTERNAL MEDICINE

## 2023-05-18 RX ORDER — GLYCOPYRROLATE 2 MG/1
4 TABLET ORAL 2 TIMES DAILY
Qty: 360 TABLET | Refills: 3 | Status: SHIPPED | OUTPATIENT
Start: 2023-05-18

## 2023-05-18 RX ORDER — MELOXICAM 15 MG/1
15 TABLET ORAL DAILY PRN
Qty: 90 TABLET | Refills: 3 | Status: SHIPPED | OUTPATIENT
Start: 2023-05-18

## 2023-05-18 RX ORDER — CLONAZEPAM 1 MG/1
1 TABLET ORAL 2 TIMES DAILY PRN
Qty: 60 TABLET | Refills: 0 | Status: SHIPPED | OUTPATIENT
Start: 2023-05-18

## 2023-05-18 RX ORDER — POLYETHYLENE GLYCOL 3350 17 G/17G
17 POWDER, FOR SOLUTION ORAL DAILY PRN
Qty: 850 G | Refills: 1 | Status: SHIPPED | OUTPATIENT
Start: 2023-05-18

## 2023-05-18 RX ORDER — ALPRAZOLAM 1 MG/1
1 TABLET ORAL 3 TIMES DAILY PRN
Qty: 90 TABLET | Refills: 0 | Status: CANCELLED | OUTPATIENT
Start: 2023-05-18

## 2023-05-18 RX ORDER — RISPERIDONE 2 MG/1
2 TABLET ORAL DAILY
Qty: 90 TABLET | Refills: 3 | Status: SHIPPED | OUTPATIENT
Start: 2023-05-18

## 2023-05-19 DIAGNOSIS — F41.9 ANXIETY: ICD-10-CM

## 2023-05-19 NOTE — TELEPHONE ENCOUNTER
CONTROLLED MEDICATION. PLEASE ADVISE.     Xanax 1mg     Last Filled: 3/6/23 #90-no refills  Last Visit: 5/18/23  Next Appt: 8/18/23  Last UDS: 5/19/23  Last Controlled Contract: 5/19/23

## 2023-05-20 RX ORDER — ALPRAZOLAM 1 MG/1
TABLET ORAL
Qty: 90 TABLET | OUTPATIENT
Start: 2023-05-20

## 2023-05-20 NOTE — TELEPHONE ENCOUNTER
At his appointment, we discussed stopping this med and trialing the longer acting clonazepam. Please inquire.

## 2023-05-22 NOTE — TELEPHONE ENCOUNTER
Called patient mother- she is aware   She was a little confused and thought he was still suppose to take both medications. I read the message below she understands now.

## 2023-06-08 DIAGNOSIS — F41.9 ANXIETY: ICD-10-CM

## 2023-06-09 RX ORDER — ALPRAZOLAM 1 MG/1
TABLET ORAL
Qty: 90 TABLET | Refills: 0 | OUTPATIENT
Start: 2023-06-09

## 2023-06-09 RX ORDER — CLONAZEPAM 1 MG/1
1 TABLET ORAL 2 TIMES DAILY PRN
Qty: 60 TABLET | Refills: 0 | Status: SHIPPED | OUTPATIENT
Start: 2023-06-09

## 2023-06-09 NOTE — TELEPHONE ENCOUNTER
Last Office Visit: 05/18/2023  Next Office Visit: 08/18/2023  Last Date Prescribed:03/06/2023  Last Urine Drug Screen: 05/18/2023 POC Urine Drug Screen [facility] (05/18/2023 10:46)   Date of Signed Controlled Contract: 05/18/2023

## 2023-06-17 DIAGNOSIS — F41.9 ANXIETY: ICD-10-CM

## 2023-06-19 RX ORDER — RISPERIDONE 2 MG/1
2 TABLET ORAL DAILY
Qty: 90 TABLET | Refills: 3 | Status: SHIPPED | OUTPATIENT
Start: 2023-06-19

## 2023-08-07 DIAGNOSIS — F41.9 ANXIETY: ICD-10-CM

## 2023-08-07 RX ORDER — CLONAZEPAM 1 MG/1
TABLET ORAL
Qty: 60 TABLET | Refills: 0 | Status: SHIPPED | OUTPATIENT
Start: 2023-08-07

## 2023-08-07 NOTE — TELEPHONE ENCOUNTER
CONTROLLED MEDICATION. PLEASE ADVISE.     Clonazepam 1mg    Last Filled: 6/9/23 #60-no refills   Last Visit: 5/18/23  Next Appt: 8/18/23  Last UDS: 5/18/23  Last Controlled Contract: 5/18/23

## 2023-08-18 ENCOUNTER — OFFICE VISIT (OUTPATIENT)
Dept: INTERNAL MEDICINE | Facility: CLINIC | Age: 53
End: 2023-08-18
Payer: MEDICARE

## 2023-08-18 VITALS
WEIGHT: 155 LBS | TEMPERATURE: 98.7 F | HEIGHT: 69 IN | OXYGEN SATURATION: 95 % | SYSTOLIC BLOOD PRESSURE: 108 MMHG | BODY MASS INDEX: 22.96 KG/M2 | HEART RATE: 88 BPM | DIASTOLIC BLOOD PRESSURE: 72 MMHG

## 2023-08-18 DIAGNOSIS — R63.4 WEIGHT LOSS: ICD-10-CM

## 2023-08-18 DIAGNOSIS — K92.1 MELENA: ICD-10-CM

## 2023-08-18 DIAGNOSIS — Z00.00 ANNUAL PHYSICAL EXAM: Primary | ICD-10-CM

## 2023-08-18 DIAGNOSIS — Z13.220 SCREENING FOR LIPID DISORDERS: ICD-10-CM

## 2023-08-18 DIAGNOSIS — Z11.59 NEED FOR HEPATITIS C SCREENING TEST: ICD-10-CM

## 2023-08-18 DIAGNOSIS — F41.9 ANXIETY: ICD-10-CM

## 2023-08-18 LAB
EXPIRATION DATE: NORMAL
HGB BLDA-MCNC: 12.9 G/DL (ref 12–17)
Lab: NORMAL

## 2023-08-18 RX ORDER — LANOLIN ALCOHOL/MO/W.PET/CERES
3 CREAM (GRAM) TOPICAL
COMMUNITY
Start: 2023-05-18 | End: 2023-08-18

## 2023-08-18 RX ORDER — CLONAZEPAM 2 MG/1
2 TABLET ORAL
Qty: 30 TABLET | Refills: 0 | Status: SHIPPED | OUTPATIENT
Start: 2023-08-18

## 2023-08-18 RX ORDER — CLONAZEPAM 1 MG/1
1 TABLET ORAL EVERY MORNING
Qty: 30 TABLET | Refills: 0 | Status: SHIPPED | OUTPATIENT
Start: 2023-08-18

## 2023-08-18 NOTE — PROGRESS NOTES
The ABCs of the Annual Wellness Visit  Subsequent Medicare Wellness Visit    Subjective    Luis Evans is a 52 y.o. male who presents for a Subsequent Medicare Wellness Visit.    The following portions of the patient's history were reviewed and   updated as appropriate: allergies, current medications, past family history, past medical history, past social history, past surgical history, and problem list.    History provided by caregiver    Compared to one year ago, the patient feels his physical   health is the same.    Compared to one year ago, the patient feels his mental   health is the same    Recent Hospitalizations:  He was not admitted to the hospital during the last year.       Current Medical Providers:  Patient Care Team:  Leonardo Barraza Jr., MD as PCP - General (Internal Medicine)    Outpatient Medications Prior to Visit   Medication Sig Dispense Refill    glycopyrrolate (ROBINUL) 2 MG tablet Take 2 tablets by mouth 2 (Two) Times a Day. 360 tablet 3    Melatonin 3 MG capsule Take 1 capsule by mouth Every Night. 90 capsule 3    meloxicam (MOBIC) 15 MG tablet Take 1 tablet by mouth Daily As Needed for Moderate Pain. 90 tablet 3    ALPRAZolam (XANAX) 1 MG tablet TAKE 1 TABLET BY MOUTH THREE TIMES DAILY AS NEEDED FOR ANXIETY 90 tablet 0    clonazePAM (KlonoPIN) 1 MG tablet TAKE 1 TABLET BY MOUTH TWICE DAILY AS NEEDED FOR ANXIETY 60 tablet 0    polyethylene glycol (MIRALAX) 17 GM/SCOOP powder Take 17 g by mouth Daily As Needed (for constipation). (Patient not taking: Reported on 8/18/2023) 850 g 1    melatonin 3 MG tablet Take 1 tablet by mouth every night at bedtime. (Patient not taking: Reported on 8/18/2023)      risperiDONE (risperDAL) 2 MG tablet TAKE 1 TABLET BY MOUTH DAILY (Patient not taking: Reported on 8/18/2023) 90 tablet 3     No facility-administered medications prior to visit.       No opioid medication identified on active medication list. I have reviewed chart for other potential   "high risk medication/s and harmful drug interactions in the elderly.        Aspirin is not on active medication list.  Aspirin use is not indicated based on review of current medical condition/s. Risk of harm outweighs potential benefits.  .    Patient Active Problem List   Diagnosis    Anxiety    Encopresis    Intellectual functioning disability    Irritable bowel syndrome    Drooling     Advance Care Planning   Advance Care Planning     Advance Directive is not on file.  ACP discussion was held with the patient during this visit. Patient does not have an advance directive, information provided.     Objective    Vitals:    23 1109   BP: 108/72   Pulse: 88   Temp: 98.7 øF (37.1 øC)   SpO2: 95%   Weight: 70.3 kg (155 lb)   Height: 175.3 cm (69\")       Wt Readings from Last 3 Encounters:   23 70.3 kg (155 lb)   23 72 kg (158 lb 12.8 oz)   22 82 kg (180 lb 12.8 oz)       Estimated body mass index is 22.89 kg/mý as calculated from the following:    Height as of this encounter: 175.3 cm (69\").    Weight as of this encounter: 70.3 kg (155 lb).    BMI is within normal parameters. No other follow-up for BMI required.      Does the patient have evidence of cognitive impairment? Yes          HEALTH RISK ASSESSMENT    Smoking Status:  Social History     Tobacco Use   Smoking Status Never   Smokeless Tobacco Never     Alcohol Consumption:  Social History     Substance and Sexual Activity   Alcohol Use Never     Fall Risk Screen:    STEADI Fall Risk Assessment has not been completed.    Depression Screenin/18/2023    11:12 AM   PHQ-2/PHQ-9 Depression Screening   Little Interest or Pleasure in Doing Things 0-->not at all   Feeling Down, Depressed or Hopeless 0-->not at all   PHQ-9: Brief Depression Severity Measure Score 0       Health Habits and Functional and Cognitive Screenin/18/2023    11:00 AM   Functional & Cognitive Status   Do you have difficulty preparing food and eating? No "   Do you have difficulty bathing yourself, getting dressed or grooming yourself? Yes   Do you have difficulty using the toilet? Yes   Do you have difficulty moving around from place to place? No   Do you have trouble with steps or getting out of a bed or a chair? No   Current Diet Well Balanced Diet   Dental Exam Not up to date   Eye Exam Not up to date   Exercise (times per week) 0 times per week   Current Exercises Include No Regular Exercise   Do you need help using the phone?  Yes   Are you deaf or do you have serious difficulty hearing?  No   Do you need help to go to places out of walking distance? No   Do you need help shopping? Yes   Do you need help preparing meals?  Yes   Do you need help with housework?  Yes   Do you need help with laundry? Yes   Do you need help taking your medications? Yes   Do you need help managing money? Yes   Do you ever drive or ride in a car without wearing a seat belt? No   Have you felt unusual stress, anger or loneliness in the last month? No   If you need help, do you have trouble finding someone available to you? No   Have you been bothered in the last four weeks by sexual problems? No   Do you have difficulty concentrating, remembering or making decisions? No       Age-appropriate Screening Schedule:  Refer to the list below for future screening recommendations based on patient's age, sex and/or medical conditions. Orders for these recommended tests are listed in the plan section. The patient has been provided with a written plan.    Health Maintenance   Topic Date Due    COVID-19 Vaccine (1) Never done    TDAP/TD VACCINES (1 - Tdap) Never done    HEPATITIS C SCREENING  Never done    ZOSTER VACCINE (1 of 2) Never done    INFLUENZA VACCINE  10/01/2023    ANNUAL WELLNESS VISIT  08/18/2024    COLORECTAL CANCER SCREENING  11/30/2024    Pneumococcal Vaccine 0-64  Aged Out                  CMS Preventative Services Quick Reference  Risk Factors Identified During  Encounter  Immunizations Discussed/Encouraged: Td, Prevnar 20 (Pneumococcal 20-valent conjugate), Shingrix, and COVID19  Dental Screening Recommended  The above risks/problems have been discussed with the patient.  Pertinent information has been shared with the patient in the After Visit Summary.  An After Visit Summary and PPPS were made available to the patient.    Follow Up:   Next Medicare Wellness visit to be scheduled in 1 year.       Additional E&M Note during same encounter follows:  Patient has multiple medical problems which are significant and separately identifiable that require additional work above and beyond the Medicare Wellness Visit.      Chief Complaint  Medicare Wellness-subsequent, Anxiety (Follow up ), Weight Loss (Patient is losing weight ), and Black or Bloody Stool (Patient was having black stools /But has lightened up some )    Subjective          Luis Evans presents to [facility]  History of Present Illness  Anxiety - caregiver reports that patient is waking up earlier than normal. Amenable to trying 2mg at nighttime and 1mg during the day. She reports it seems to work as well as the xanax  Patient is losing weight. Patient stool has been black as well. Patient has been acting ok and eating ok. He does seem to get full sooner.     Current Outpatient Medications   Medication Instructions    clonazePAM (KLONOPIN) 2 mg, Oral, Every Night at Bedtime    clonazePAM (KLONOPIN) 1 mg, Oral, Every Morning, for anxiety    glycopyrrolate (ROBINUL) 4 mg, Oral, 2 Times Daily    Melatonin 3 mg, Oral, Nightly    meloxicam (MOBIC) 15 mg, Oral, Daily PRN    polyethylene glycol (MIRALAX) 17 g, Oral, Daily PRN       The following portions of the patient's history were reviewed and updated as appropriate: allergies, current medications, past family history, past medical history, past social history, past surgical history, and problem list.    Objective  "  Vital Signs:   /72   Pulse 88   Temp 98.7 øF (37.1 øC)   Ht 175.3 cm (69\")   Wt 70.3 kg (155 lb)   SpO2 95%   BMI 22.89 kg/mý     BP Readings from Last 3 Encounters:   08/18/23 108/72   05/18/23 106/72   12/05/22 138/92     Wt Readings from Last 3 Encounters:   08/18/23 70.3 kg (155 lb)   05/18/23 72 kg (158 lb 12.8 oz)   12/05/22 82 kg (180 lb 12.8 oz)     BMI is within normal parameters. No other follow-up for BMI required.    Physical Exam     Appearance: No acute distress, well-nourished  Head: normocephalic, atraumatic  Eyes: extraocular movements intact, no scleral icterus, no conjunctival injection  Ears, Nose, and Throat: external ears normal, nares patent, moist mucous membranes  Cardiovascular: regular rate and rhythm. no murmurs, rubs, or gallops. no edema  Respiratory: breathing comfortably, symmetric chest rise, clear to auscultation bilaterally. No wheezes, rales, or rhonchi.  Neuro: alert and oriented to time, place, and person. Normal gait  Psych: normal mood and affect     Result Review :   The following data was reviewed by: Leonardo Barraza Jr, MD on 08/18/2023:  Common labs          8/18/2023    12:10   Common Labs   Hemoglobin 12.9        No results found for: SARSANTIGEN, COVID19, RAPFLUA, RAPFLUB, FLUAAG, FLUABDAG, FLUABDAG, FLU, FLU, FLUBAG, RAPSCRN, STREPAAG, RSV, POCPREGUR, MONOSPOT, INR, LEADCAPBLD, POCLEAD, BILIRUBINUR       Assessment and Plan    Diagnoses and all orders for this visit:    1. Annual physical exam (Primary)  -     Magnesium  -     POCT hemoglobin    2. Anxiety  Comments:  will inc nighttime dose klonopin. cont 1mg in am. NELA and ade reviewed.  Orders:  -     clonazePAM (KlonoPIN) 2 MG tablet; Take 1 tablet by mouth every night at bedtime.  Dispense: 30 tablet; Refill: 0  -     clonazePAM (KlonoPIN) 1 MG tablet; Take 1 tablet by mouth Every Morning. for anxiety  Dispense: 30 tablet; Refill: 0    3. Weight loss  Comments:  concern with weight loss " and melena. will obtain labs. also refer to GI for scopes and workup. POC hgb in clinic reassuring.  Orders:  -     Ambulatory Referral to Gastroenterology  -     Comprehensive Metabolic Panel  -     CBC & Differential  -     Sedimentation Rate  -     Hemoglobin A1c  -     HIV-1 / O / 2 Ag / Antibody 4th Generation  -     TSH    4. Melena  -     Ambulatory Referral to Gastroenterology    5. Screening for lipid disorders  -     Lipid Panel    6. Need for hepatitis C screening test  -     HCV Antibody Rfx To Qnt PCR          Medications Discontinued During This Encounter   Medication Reason    ALPRAZolam (XANAX) 1 MG tablet Alternate therapy    risperiDONE (risperDAL) 2 MG tablet *Therapy completed    melatonin 3 MG tablet *Therapy completed    clonazePAM (KlonoPIN) 1 MG tablet Reorder          Follow Up   Return in about 3 months (around 11/18/2023) for Recheck.  Patient was given instructions and counseling regarding his condition or for health maintenance advice. Please see specific information pulled into the AVS if appropriate.       Leonardo Barraza Jr, MD  08/18/23  12:27 EDT

## 2023-10-05 DIAGNOSIS — F41.9 ANXIETY: ICD-10-CM

## 2023-10-06 RX ORDER — CLONAZEPAM 2 MG/1
2 TABLET ORAL
Qty: 30 TABLET | Refills: 0 | Status: SHIPPED | OUTPATIENT
Start: 2023-10-06

## 2023-11-13 DIAGNOSIS — F41.9 ANXIETY: ICD-10-CM

## 2023-11-13 NOTE — TELEPHONE ENCOUNTER
Refill request for  controlled substance.      Date of request: 11/13/2023   Medication requested: Klonopin (Clonazepam)  Last OV: 8/18/23  Last UDS: 5/18/23  Contract signed: yes    Date:5/18/23  Next office visit: 11/30/23    Adelina Bazzi

## 2023-11-14 RX ORDER — CLONAZEPAM 2 MG/1
2 TABLET ORAL
Qty: 30 TABLET | Refills: 0 | Status: SHIPPED | OUTPATIENT
Start: 2023-11-14

## 2023-11-20 ENCOUNTER — APPOINTMENT (OUTPATIENT)
Dept: CT IMAGING | Facility: HOSPITAL | Age: 53
End: 2023-11-20
Payer: MEDICARE

## 2023-11-20 ENCOUNTER — APPOINTMENT (OUTPATIENT)
Dept: GENERAL RADIOLOGY | Facility: HOSPITAL | Age: 53
End: 2023-11-20
Payer: MEDICARE

## 2023-11-20 ENCOUNTER — HOSPITAL ENCOUNTER (EMERGENCY)
Facility: HOSPITAL | Age: 53
Discharge: HOME | End: 2023-11-20
Attending: EMERGENCY MEDICINE | Admitting: EMERGENCY MEDICINE
Payer: MEDICARE

## 2023-11-20 VITALS
HEIGHT: 69 IN | OXYGEN SATURATION: 97 % | DIASTOLIC BLOOD PRESSURE: 76 MMHG | SYSTOLIC BLOOD PRESSURE: 117 MMHG | BODY MASS INDEX: 20.18 KG/M2 | RESPIRATION RATE: 12 BRPM | WEIGHT: 136.24 LBS | TEMPERATURE: 98.4 F | HEART RATE: 100 BPM
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
## 2023-11-20 DIAGNOSIS — S00.81XA ABRASION OF FOREHEAD, INITIAL ENCOUNTER: ICD-10-CM

## 2023-11-20 DIAGNOSIS — S80.211A ABRASION OF RIGHT KNEE, INITIAL ENCOUNTER: ICD-10-CM

## 2023-11-20 DIAGNOSIS — S63.272A CLOSED DISLOCATION OF INTERPHALANGEAL JOINT OF RIGHT MIDDLE FINGER: ICD-10-CM

## 2023-11-20 PROCEDURE — 70450 CT HEAD/BRAIN W/O DYE: CPT

## 2023-11-20 PROCEDURE — 99284 EMERGENCY DEPT VISIT MOD MDM: CPT

## 2023-11-20 PROCEDURE — 73130 X-RAY EXAM OF HAND: CPT

## 2023-11-20 NOTE — ED PROVIDER NOTES
Time: 1:59 AM EST  Date of encounter:  11/20/2023  Independent Historian/Clinical History and Information was obtained by:   Family    History is limited by:  Nonverbal    Chief Complaint: Fall      History of Present Illness:  Patient is a 53 y.o. year old male who presents to the emergency department for evaluation of fall    The patient is accompanied to the emergency department by his sister and his mother he states that they believe that he fell down approximately half flight of stairs.  They estimate anywhere from 5-8 steps.  Patient's sister was asleep on the couch when she heard the commotion.  Found the patient on the floor.  They state he has not felt well lately.  But they believe he is more sleepy since the fall than usual.  They note the abrasion on the patient's right forehead right knee and deformity to right middle finger.    HPI    Patient Care Team  Primary Care Provider: Leonardo Barraza Jr., MD    Past Medical History:     No Known Allergies  Past Medical History:   Diagnosis Date    Anxiety     Drooling      History reviewed. No pertinent surgical history.  History reviewed. No pertinent family history.    Home Medications:  Prior to Admission medications    Medication Sig Start Date End Date Taking? Authorizing Provider   clonazePAM (KlonoPIN) 1 MG tablet Take 1 tablet by mouth Every Morning. for anxiety 8/18/23   Leonardo Barraza Jr., MD   clonazePAM (KlonoPIN) 2 MG tablet TAKE 1 TABLET BY MOUTH EVERY NIGHT AT BEDTIME 11/14/23   Leonardo Barraza Jr., MD   glycopyrrolate (ROBINUL) 2 MG tablet Take 2 tablets by mouth 2 (Two) Times a Day. 5/18/23   Leonardo Barraza Jr., MD   Melatonin 3 MG capsule Take 1 capsule by mouth Every Night. 5/18/23   Leonardo Barraza Jr., MD   meloxicam (MOBIC) 15 MG tablet Take 1 tablet by mouth Daily As Needed for Moderate Pain. 5/18/23   Leonardo Barraza Jr., MD   polyethylene glycol (MIRALAX) 17 GM/SCOOP powder Take 17 g by  "mouth Daily As Needed (for constipation).  Patient not taking: Reported on 8/18/2023 5/18/23   Leonardo Barraza Jr., MD        Social History:   Social History     Tobacco Use    Smoking status: Never    Smokeless tobacco: Never   Vaping Use    Vaping Use: Never used   Substance Use Topics    Alcohol use: Never    Drug use: Never         Review of Systems:  Review of Systems   Unable to perform ROS: Patient nonverbal        Physical Exam:  /76 (BP Location: Right arm, Patient Position: Lying)   Pulse 100   Temp 98.4 °F (36.9 °C)   Resp 12   Ht 175.3 cm (69\")   Wt 61.8 kg (136 lb 3.9 oz)   SpO2 97%   BMI 20.12 kg/m²     Physical Exam  Vitals and nursing note reviewed.   Constitutional:       General: He is not in acute distress.     Appearance: He is not toxic-appearing.      Comments: Patient appears sleepy but awakens to voice   HENT:      Head: Normocephalic and atraumatic.      Jaw: There is normal jaw occlusion.      Comments: Superficial abrasion to right forehead  Eyes:      General: Lids are normal.      Extraocular Movements: Extraocular movements intact.      Conjunctiva/sclera: Conjunctivae normal.      Pupils: Pupils are equal, round, and reactive to light.   Cardiovascular:      Rate and Rhythm: Normal rate and regular rhythm.      Pulses: Normal pulses.      Heart sounds: Normal heart sounds.   Pulmonary:      Effort: Pulmonary effort is normal. No respiratory distress.      Breath sounds: Normal breath sounds. No wheezing or rhonchi.   Abdominal:      General: Abdomen is flat.      Palpations: Abdomen is soft.      Tenderness: There is no abdominal tenderness. There is no guarding or rebound.   Musculoskeletal:         General: Normal range of motion.      Cervical back: Normal range of motion and neck supple.      Right lower leg: No edema.      Left lower leg: No edema.      Comments: Deformity of right middle finger at PIP joint consistent with dorsal dislocation   Skin:     " General: Skin is warm and dry.      Comments: Right knee abrasion   Neurological:      Mental Status: He is oriented to person, place, and time. Mental status is at baseline.   Psychiatric:         Mood and Affect: Mood normal.                Procedures:  Upper Extremity Dislocation    Date/Time: 11/20/2023 3:10 AM    Performed by: Demetris Saleh MD  Authorized by: Demetris Saleh MD  Consent: Verbal consent obtained.  Risks and benefits: risks, benefits and alternatives were discussed  Consent given by: parent  Patient identity confirmed: arm band  Injury location: finger  Location details: right long finger  Injury type: dislocation  Dislocation type: PIP  Pre-procedure neurovascular assessment: neurovascularly intact  Pre-procedure distal perfusion: normal  Pre-procedure neurological function: normal  Pre-procedure range of motion: reduced    Anesthesia:  Local anesthesia used: no    Sedation:  Patient sedated: no    Manipulation performed: yes  Reduction successful: yes  Immobilization: splint  Splint type: static finger  Supplies used: aluminum splint  Post-procedure distal perfusion: normal  Post-procedure neurological function: normal  Post-procedure range of motion: improved  Patient tolerance: patient tolerated the procedure well with no immediate complications            Medical Decision Making:      Comorbidities that affect care:    Anxiety, nonverbal at baseline    External Notes reviewed:    Previous Clinic Note: PCP visit 8/18/2023      The following orders were placed and all results were independently analyzed by me:  Orders Placed This Encounter   Procedures    Splint Application    Orthopedic Injury Treatment    XR Hand 3+ View Right    CT Head Without Contrast       Medications Given in the Emergency Department:  Medications - No data to display     ED Course:         Labs:    Lab Results (last 24 hours)       ** No results found for the last 24 hours. **             Imaging:    CT Head Without  Contrast    Result Date: 11/20/2023  PROCEDURE: CT HEAD WO CONTRAST  COMPARISON:  None  INDICATIONS: fall  PROTOCOL:   Standard imaging protocol performed    RADIATION:   DLP: 1018.2 mGy*cm   MA and/or KV was adjusted to minimize radiation dose.     TECHNIQUE: After obtaining the patient's consent, CT images were obtained without non-ionic intravenous contrast material.  FINDINGS:  No acute intracranial hemorrhage, mass, mass effect, or evidence of acute ischemia is seen. The ventricular system is nondilated. The basilar cisterns are patent. The skull is intact without displaced fracture. The visualized paranasal sinuses and mastoid air cells are clear.        No acute intracranial abnormality is identified.     SENDY GREEN MD       Electronically Signed and Approved By: SENDY GREEN MD on 11/20/2023 at 2:53             XR Hand 3+ View Right    Result Date: 11/20/2023  PROCEDURE: XR HAND 3+ VW RIGHT  COMPARISON: None  INDICATIONS: fall RT HAND  FINDINGS:  The middle finger proximal interphalangeal joint is dislocated.  There is complete dorsal dislocation and partial ulnar dislocation of the middle phalanx relative to the proximal phalanx.  No fractures are seen.        Middle finger proximal interphalangeal joint dislocation.      SENDY GREEN MD       Electronically Signed and Approved By: SENDY GREEN MD on 11/20/2023 at 2:31                Differential Diagnosis and Discussion:    Trauma:  Differential diagnosis considered but not limited to were subarachnoid hemorrhage, intracranial bleeding, pneumothorax, cardiac contusion, lung contusion, intra-abdominal bleeding, and compartment syndrome of any extremity or other significant traumatic pathology    All X-rays impressions were independently interpreted by me.  CT scan radiology impression was interpreted by me.    MDM               Patient Care Considerations:    NARCOTICS: I considered prescribing opiate pain medication as an outpatient,  however no pain control required in the ED      Consultants/Shared Management Plan:    None    Social Determinants of Health:    Patient has presented with family members who are responsible, reliable and will ensure follow up care.      Disposition and Care Coordination:    Discharged: The patient is suitable and stable for discharge with no need for consideration of observation or admission.    I have explained the patient´s condition, diagnoses and treatment plan based on the information available to me at this time. I have answered questions and addressed any concerns. The patient has a good  understanding of the patient´s diagnosis, condition, and treatment plan as can be expected at this point. The vital signs have been stable. The patient´s condition is stable and appropriate for discharge from the emergency department.      The patient will pursue further outpatient evaluation with the primary care physician or other designated or consulting physician as outlined in the discharge instructions. They are agreeable to this plan of care and follow-up instructions have been explained in detail. The patient has received these instructions in written format and have expressed an understanding of the discharge instructions. The patient is aware that any significant change in condition or worsening of symptoms should prompt an immediate return to this or the closest emergency department or call to 911.  I have explained discharge medications and the need for follow up with the patient/caretakers. This was also printed in the discharge instructions. Patient was discharged with the following medications and follow up:      Medication List      No changes were made to your prescriptions during this visit.      Leonardo Barraza Jr., MD  [address] [place] [address]  [facility] [id number]  [phone]    Schedule an appointment as soon as possible for a visit          Final diagnoses:   Fall down stairs, initial  encounter   Abrasion of forehead, initial encounter   Abrasion of right knee, initial encounter   Closed dislocation of interphalangeal joint of right middle finger        ED Disposition       ED Disposition   Discharge    Condition   Stable    Comment   --               This medical record created using voice recognition software.             Demetris Saleh MD  11/20/23 [id number]

## 2023-11-22 ENCOUNTER — OFFICE VISIT (OUTPATIENT)
Dept: INTERNAL MEDICINE | Facility: CLINIC | Age: 53
End: 2023-11-22
Payer: MEDICARE

## 2023-11-22 ENCOUNTER — TELEPHONE (OUTPATIENT)
Dept: ORTHOPEDIC SURGERY | Facility: CLINIC | Age: 53
End: 2023-11-22
Payer: MEDICARE

## 2023-11-22 VITALS
BODY MASS INDEX: 21.66 KG/M2 | HEART RATE: 108 BPM | DIASTOLIC BLOOD PRESSURE: 77 MMHG | WEIGHT: 146.2 LBS | TEMPERATURE: 97.7 F | SYSTOLIC BLOOD PRESSURE: 115 MMHG | HEIGHT: 69 IN | OXYGEN SATURATION: 95 %

## 2023-11-22 DIAGNOSIS — S63.282S: ICD-10-CM

## 2023-11-22 DIAGNOSIS — F79 INTELLECTUAL FUNCTIONING DISABILITY: ICD-10-CM
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
## 2023-11-22 NOTE — TELEPHONE ENCOUNTER
NEW PT - Dislocation of proximal interphalangeal joint of right middle finger - PROG NOTE 11.22.23 - XR 11.20.23

## 2023-11-22 NOTE — PROGRESS NOTES
"Chief Complaint  ER FOLLOW UP and Fall    Subjective          Lius Evans presents to [facility]  History of Present Illness    Historian: Mother    Here with mother.  Mr. Evans is a 54 yo M with intellectual disability who is nonverbal.    Seen in ER after a fall from stairs.  Was not witnessed by mother, but she believed that he lost consciousness.  Had x-ray of right hand showing dislocated joint in right finger, that was reset in ER.  Had CT head that was reassuring.  Doing well since discharge from ER.    Current Outpatient Medications   Medication Instructions    clonazePAM (KLONOPIN) 2 mg, Oral, Every Night at Bedtime    glycopyrrolate (ROBINUL) 4 mg, Oral, 2 Times Daily    Melatonin 3 mg, Oral, Nightly    meloxicam (MOBIC) 15 mg, Oral, Daily PRN    polyethylene glycol (MIRALAX) 17 g, Oral, Daily PRN       The following portions of the patient's history were reviewed and updated as appropriate: allergies, current medications, past family history, past medical history, past social history, past surgical history, and problem list.    Objective   Vital Signs:   /77 (BP Location: Left arm, Patient Position: Sitting, Cuff Size: Adult)   Pulse 108   Temp 97.7 °F (36.5 °C) (Temporal)   Ht 175.3 cm (69\")   Wt 66.3 kg (146 lb 3.2 oz)   SpO2 95%   BMI 21.59 kg/m²     BP Readings from Last 3 Encounters:   11/22/23 115/77   11/20/23 117/76   08/18/23 108/72     Wt Readings from Last 3 Encounters:   11/22/23 66.3 kg (146 lb 3.2 oz)   11/20/23 61.8 kg (136 lb 3.9 oz)   08/18/23 70.3 kg (155 lb)     BMI is within normal parameters. No other follow-up for BMI required.    Physical Exam  Vitals reviewed.   Constitutional:       General: He is not in acute distress.     Appearance: Normal appearance. He is not ill-appearing, toxic-appearing or diaphoretic.   HENT:      Head: Normocephalic and atraumatic.      Right Ear: External ear normal.      Left Ear: " "External ear normal.   Eyes:      Conjunctiva/sclera: Conjunctivae normal.   Cardiovascular:      Rate and Rhythm: Normal rate and regular rhythm.      Pulses: Normal pulses.      Heart sounds: Normal heart sounds. No murmur heard.     No friction rub. No gallop.   Pulmonary:      Effort: Pulmonary effort is normal. No respiratory distress.      Breath sounds: Normal breath sounds. No stridor. No wheezing, rhonchi or rales.   Chest:      Chest wall: No tenderness.   Abdominal:      General: Abdomen is flat.      Palpations: Abdomen is soft. There is no mass.      Tenderness: There is no abdominal tenderness.   Musculoskeletal:      Right lower leg: No edema.      Left lower leg: No edema.      Comments: Finger splint and dressing removed.  Mild bruising noted on right middle finger.  Finger splint and dressing replaced after exam   Skin:     General: Skin is warm and dry.   Neurological:      Mental Status: He is alert. Mental status is at baseline.   Psychiatric:         Behavior: Behavior normal.         Thought Content: Thought content normal.         Judgment: Judgment normal.          Result Review :   The following data was reviewed by: Benigno Claire MD on 11/22/2023:  Common labs          8/18/2023    12:10   Common Labs   Hemoglobin 12.9             No results found for: \"SARSANTIGEN\", \"COVID19\", \"RAPFLUA\", \"RAPFLUB\", \"FLUAAG\", \"FLUABDAG\", \"FLU\", \"FLUBAG\", \"RAPSCRN\", \"STREPAAG\", \"RSV\", \"POCPREGUR\", \"MONOSPOT\", \"INR\", \"LEADCAPBLD\", \"POCLEAD\", \"BILIRUBINUR\"    Procedures        Assessment and Plan    Diagnoses and all orders for this visit:    1. Fall down stairs, sequela (Primary)    2. Dislocation of proximal interphalangeal joint of right middle finger, sequela  -     Ambulatory Referral to Orthopedic Surgery    3. Intellectual functioning disability          Medications Discontinued During This Encounter   Medication Reason    clonazePAM (KlonoPIN) 1 MG tablet *Therapy completed          Follow Up "   Return if symptoms worsen or fail to improve.  Patient was given instructions and counseling regarding his condition or for health maintenance advice. Please see specific information pulled into the AVS if appropriate.       Benigno Claire MD  11/22/23  12:14 EST

## 2023-11-29 DIAGNOSIS — S63.282S: Primary | ICD-10-CM

## 2023-11-30 ENCOUNTER — OFFICE VISIT (OUTPATIENT)
Dept: INTERNAL MEDICINE | Facility: CLINIC | Age: 53
End: 2023-11-30
Payer: MEDICARE

## 2023-11-30 VITALS
TEMPERATURE: 97.7 F | WEIGHT: 152.2 LBS | SYSTOLIC BLOOD PRESSURE: 112 MMHG | BODY MASS INDEX: 22.54 KG/M2 | OXYGEN SATURATION: 94 % | HEART RATE: 85 BPM | DIASTOLIC BLOOD PRESSURE: 75 MMHG | HEIGHT: 69 IN

## 2023-11-30 DIAGNOSIS — F41.9 ANXIETY: Primary | ICD-10-CM

## 2023-11-30 DIAGNOSIS — K11.7 DROOLING: ICD-10-CM

## 2023-11-30 DIAGNOSIS — K59.00 CONSTIPATION, UNSPECIFIED CONSTIPATION TYPE: ICD-10-CM

## 2023-11-30 PROCEDURE — 99214 OFFICE O/P EST MOD 30 MIN: CPT | Performed by: INTERNAL MEDICINE

## 2023-11-30 RX ORDER — GLYCOPYRROLATE 2 MG/1
4 TABLET ORAL 2 TIMES DAILY
Qty: 360 TABLET | Refills: 3 | Status: SHIPPED | OUTPATIENT
Start: 2023-11-30

## 2023-11-30 NOTE — PROGRESS NOTES
"Chief Complaint  Anxiety (Follow up ), Med Refill, Hand Injury (Right hand /Patient did fall down the stairs ), and Constipation (Patient needs something /It takes a long time for him to pass stools- when he does they are big and hard and dry, like crumbs )    Subjective          Luis Evans presents to [facility]  History of Present Illness  Caregiver is concerned that patient is constipated. He has hard,dry and bulky stools. Patient was given milk of magnesia and had subsequent diarrhea. Patient does miralax 1 cap daily intermittently as well.   Patient doing well with 2mg klonopin in the evening.   Patient did have recent fall with dislocated finger. Awaiting appointment with hand specialist at this time.     Current Outpatient Medications   Medication Instructions    clonazePAM (KLONOPIN) 2 mg, Oral, Every Night at Bedtime    glycopyrrolate (ROBINUL) 4 mg, Oral, 2 Times Daily    Melatonin 3 mg, Oral, Nightly    meloxicam (MOBIC) 15 mg, Oral, Daily PRN    polyethylene glycol (MIRALAX) 17 g, Oral, Daily PRN       The following portions of the patient's history were reviewed and updated as appropriate: allergies, current medications, past family history, past medical history, past social history, past surgical history, and problem list.    Objective   Vital Signs:   /75 (BP Location: Left arm)   Pulse 85   Temp 97.7 °F (36.5 °C) (Temporal)   Ht 175.3 cm (69\")   Wt 69 kg (152 lb 3.2 oz)   SpO2 94%   BMI 22.48 kg/m²     BP Readings from Last 3 Encounters:   11/30/23 112/75   11/22/23 115/77   11/20/23 117/76     Wt Readings from Last 3 Encounters:   11/30/23 69 kg (152 lb 3.2 oz)   11/22/23 66.3 kg (146 lb 3.2 oz)   11/20/23 61.8 kg (136 lb 3.9 oz)     BMI is within normal parameters. No other follow-up for BMI required.    Physical Exam     Appearance: No acute distress, well-nourished  Head: normocephalic, atraumatic  Eyes: extraocular movements " "intact, no scleral icterus, no conjunctival injection  Ears, Nose, and Throat: external ears normal, nares patent, moist mucous membranes  Cardiovascular: regular rate and rhythm. no murmurs, rubs, or gallops. no edema  Respiratory: breathing comfortably, symmetric chest rise, clear to auscultation bilaterally. No wheezes, rales, or rhonchi.  Neuro: alert and oriented to time, place, and person. Normal gait  Psych: normal mood and affect       Result Review :   The following data was reviewed by: Leonardo Barraza Jr, MD on 11/30/2023:  Common labs          8/18/2023    12:10   Common Labs   Hemoglobin 12.9          No results found for: \"SARSANTIGEN\", \"COVID19\", \"RAPFLUA\", \"RAPFLUB\", \"FLUAAG\", \"FLUABDAG\", \"FLU\", \"FLUBAG\", \"RAPSCRN\", \"STREPAAG\", \"RSV\", \"POCPREGUR\", \"MONOSPOT\", \"INR\", \"LEADCAPBLD\", \"POCLEAD\", \"BILIRUBINUR\"         Assessment and Plan    Diagnoses and all orders for this visit:    1. Anxiety (Primary)  Comments:  doing well with klonopin 2mg nightly and will cont    2. Drooling  Comments:  robinul is working at this time.  Orders:  -     glycopyrrolate (ROBINUL) 2 MG tablet; Take 2 tablets by mouth 2 (Two) Times a Day.  Dispense: 360 tablet; Refill: 3    3. Constipation, unspecified constipation type  Comments:  robinul likely contribtuing. cont miralax 1 cap daily and titrate for soft stools.        Medications Discontinued During This Encounter   Medication Reason    glycopyrrolate (ROBINUL) 2 MG tablet Reorder          Follow Up   Return in about 4 months (around 3/30/2024) for Recheck.  Patient was given instructions and counseling regarding his condition or for health maintenance advice. Please see specific information pulled into the AVS if appropriate.       Leonardo Barraza Jr, MD  11/30/23  11:43 EST            "

## 2023-12-21 DIAGNOSIS — F41.9 ANXIETY: ICD-10-CM

## 2023-12-21 NOTE — TELEPHONE ENCOUNTER
Caller: DORA MEDINA    Relationship: Mother    Best call back number: [phone]     Requested Prescriptions:   Requested Prescriptions     Pending Prescriptions Disp Refills    clonazePAM (KlonoPIN) 2 MG tablet 30 tablet 0     Sig: Take 1 tablet by mouth every night at bedtime.        Pharmacy where request should be sent: [id number] [place] [place] [facility] [address] [address] [facility] [phone] [facility] [phone] FX     Last office visit with prescribing clinician: 11/30/2023   Last telemedicine visit with prescribing clinician: Visit date not found   Next office visit with prescribing clinician: 4/8/2024     Additional details provided by patient: PATIENT IS NEEDING A REFILL.     Does the patient have less than a 3 day supply:  [x] Yes  [] No    Would you like a call back once the refill request has been completed: [x] Yes [] No    If the office needs to give you a call back, can they leave a voicemail: [x] Yes [] No    Adeline Mccarty Rep   12/21/23 11:09 EST

## 2023-12-22 RX ORDER — CLONAZEPAM 2 MG/1
2 TABLET ORAL
Qty: 30 TABLET | Refills: 0 | Status: SHIPPED | OUTPATIENT
Start: 2023-12-22

## 2023-12-22 NOTE — TELEPHONE ENCOUNTER
Refill request for  controlled substance.      Date of request: 12/22/2023    Medication requested: Klonopin (Clonazepam)  Last OV: 11/30/23  Last UDS: 5/18/23  Contract signed: yes    Date:5/18/23  Next office visit: 4/8/24    Adelina Bazzi

## 2024-01-05 ENCOUNTER — TELEPHONE (OUTPATIENT)
Dept: INTERNAL MEDICINE | Facility: CLINIC | Age: 54
End: 2024-01-05
Payer: MEDICARE

## 2024-01-05 NOTE — TELEPHONE ENCOUNTER
Left message for patient to return call to clinic.    HUB TO READ:  We are currently checking with our manager to ensure that [employer] is going to allow continuity of care with this specific insurance.

## 2024-01-05 NOTE — TELEPHONE ENCOUNTER
Caller: DORA MEDINA    Relationship: Mother    Best call back number: [phone]     What is the best time to reach you: ANY    Who are you requesting to speak with (clinical staff, provider,  specific staff member): CLINICAL STAFF    What was the call regarding: PATIENTS MOTHER CALLED WANTING TO KNOW IF DR JACOBSON WOULD COMPLETE A CONTINUITY OF CARE FORM FOR THE PATIENT.

## 2024-01-09 ENCOUNTER — APPOINTMENT (OUTPATIENT)
Dept: GENERAL RADIOLOGY | Facility: HOSPITAL | Age: 54
End: 2024-01-09
Payer: MEDICARE

## 2024-01-09 LAB — S PYO AG THROAT QL: NEGATIVE

## 2024-01-09 PROCEDURE — 87637 SARSCOV2&INF A&B&RSV AMP PRB: CPT

## 2024-01-09 PROCEDURE — 87880 STREP A ASSAY W/OPTIC: CPT

## 2024-01-09 PROCEDURE — 93005 ELECTROCARDIOGRAM TRACING: CPT | Performed by: EMERGENCY MEDICINE

## 2024-01-09 PROCEDURE — 87081 CULTURE SCREEN ONLY: CPT | Performed by: EMERGENCY MEDICINE

## 2024-01-09 PROCEDURE — 99285 EMERGENCY DEPT VISIT HI MDM: CPT

## 2024-01-09 RX ORDER — SODIUM CHLORIDE 0.9 % (FLUSH) 0.9 %
10 SYRINGE (ML) INJECTION AS NEEDED
Status: DISCONTINUED | OUTPATIENT
Start: 2024-01-09 | End: 2024-01-10

## 2024-01-10 ENCOUNTER — APPOINTMENT (OUTPATIENT)
Dept: GENERAL RADIOLOGY | Facility: HOSPITAL | Age: 54
End: 2024-01-10
Payer: MEDICARE

## 2024-01-10 ENCOUNTER — HOSPITAL ENCOUNTER (INPATIENT)
Facility: HOSPITAL | Age: 54
LOS: 4 days | Discharge: HOME | End: 2024-01-14
Attending: EMERGENCY MEDICINE | Admitting: INTERNAL MEDICINE
Payer: MEDICARE

## 2024-01-10 ENCOUNTER — APPOINTMENT (OUTPATIENT)
Dept: CT IMAGING | Facility: HOSPITAL | Age: 54
End: 2024-01-10
Payer: MEDICARE

## 2024-01-10 DIAGNOSIS — K56.41 FECAL IMPACTION: Primary | ICD-10-CM

## 2024-01-10 DIAGNOSIS — N39.0 URINARY TRACT INFECTION WITHOUT HEMATURIA, SITE UNSPECIFIED: ICD-10-CM

## 2024-01-10 DIAGNOSIS — K59.00 CONSTIPATION, UNSPECIFIED CONSTIPATION TYPE: ICD-10-CM

## 2024-01-10 DIAGNOSIS — R33.9 URINARY RETENTION: ICD-10-CM

## 2024-01-10 LAB
ALBUMIN SERPL-MCNC: 3.4 G/DL (ref 3.5–5.2)
ALBUMIN/GLOB SERPL: 0.9 G/DL
ALP SERPL-CCNC: 68 U/L (ref 39–117)
ALT SERPL W P-5'-P-CCNC: 7 U/L (ref 1–41)
ANION GAP SERPL CALCULATED.3IONS-SCNC: 16.6 MMOL/L (ref 5–15)
AST SERPL-CCNC: 17 U/L (ref 1–40)
BACTERIA UR QL AUTO: ABNORMAL /HPF
BASOPHILS # BLD AUTO: 0.04 10*3/MM3 (ref 0–0.2)
BASOPHILS NFR BLD AUTO: 0.3 % (ref 0–1.5)
BILIRUB SERPL-MCNC: 1 MG/DL (ref 0–1.2)
BILIRUB UR QL STRIP: NEGATIVE
BUN SERPL-MCNC: 15 MG/DL (ref 6–20)
BUN/CREAT SERPL: 11.4 (ref 7–25)
CALCIUM SPEC-SCNC: 8.6 MG/DL (ref 8.6–10.5)
CHLORIDE SERPL-SCNC: 99 MMOL/L (ref 98–107)
CLARITY UR: CLEAR
CO2 SERPL-SCNC: 21.4 MMOL/L (ref 22–29)
COLOR UR: YELLOW
CREAT SERPL-MCNC: 1.32 MG/DL (ref 0.76–1.27)
D-LACTATE SERPL-SCNC: 1.6 MMOL/L (ref 0.5–2)
DEPRECATED RDW RBC AUTO: 38 FL (ref 37–54)
EGFRCR SERPLBLD CKD-EPI 2021: 64.9 ML/MIN/1.73
EOSINOPHIL # BLD AUTO: 0.05 10*3/MM3 (ref 0–0.4)
EOSINOPHIL NFR BLD AUTO: 0.4 % (ref 0.3–6.2)
ERYTHROCYTE [DISTWIDTH] IN BLOOD BY AUTOMATED COUNT: 11.8 % (ref 12.3–15.4)
FLUAV SUBTYP SPEC NAA+PROBE: NOT DETECTED
FLUBV RNA ISLT QL NAA+PROBE: NOT DETECTED
GLOBULIN UR ELPH-MCNC: 3.9 GM/DL
GLUCOSE SERPL-MCNC: 126 MG/DL (ref 65–99)
GLUCOSE UR STRIP-MCNC: NEGATIVE MG/DL
GRAN CASTS URNS QL MICRO: ABNORMAL /LPF
HCT VFR BLD AUTO: 34.4 % (ref 37.5–51)
HGB BLD-MCNC: 11.5 G/DL (ref 13–17.7)
HGB UR QL STRIP.AUTO: ABNORMAL
HOLD SPECIMEN: NORMAL
HOLD SPECIMEN: NORMAL
HYALINE CASTS UR QL AUTO: ABNORMAL /LPF
IMM GRANULOCYTES # BLD AUTO: 0.05 10*3/MM3 (ref 0–0.05)
IMM GRANULOCYTES NFR BLD AUTO: 0.4 % (ref 0–0.5)
KETONES UR QL STRIP: NEGATIVE
LEUKOCYTE ESTERASE UR QL STRIP.AUTO: ABNORMAL
LYMPHOCYTES # BLD AUTO: 1.03 10*3/MM3 (ref 0.7–3.1)
LYMPHOCYTES NFR BLD AUTO: 7.7 % (ref 19.6–45.3)
MAGNESIUM SERPL-MCNC: 2.3 MG/DL (ref 1.6–2.6)
MCH RBC QN AUTO: 29.1 PG (ref 26.6–33)
MCHC RBC AUTO-ENTMCNC: 33.4 G/DL (ref 31.5–35.7)
MCV RBC AUTO: 87.1 FL (ref 79–97)
MONOCYTES # BLD AUTO: 2.03 10*3/MM3 (ref 0.1–0.9)
MONOCYTES NFR BLD AUTO: 15.1 % (ref 5–12)
NEUTROPHILS NFR BLD AUTO: 10.24 10*3/MM3 (ref 1.7–7)
NEUTROPHILS NFR BLD AUTO: 76.1 % (ref 42.7–76)
NITRITE UR QL STRIP: POSITIVE
NRBC BLD AUTO-RTO: 0 /100 WBC (ref 0–0.2)
PH UR STRIP.AUTO: 5.5 [PH] (ref 5–8)
PLAT MORPH BLD: NORMAL
PLATELET # BLD AUTO: 159 10*3/MM3 (ref 140–450)
PMV BLD AUTO: 10.8 FL (ref 6–12)
POTASSIUM SERPL-SCNC: 3 MMOL/L (ref 3.5–5.2)
PROT SERPL-MCNC: 7.3 G/DL (ref 6–8.5)
PROT UR QL STRIP: ABNORMAL
QT INTERVAL: 311 MS
QTC INTERVAL: 431 MS
RBC # BLD AUTO: 3.95 10*6/MM3 (ref 4.14–5.8)
RBC # UR STRIP: ABNORMAL /HPF
RBC MORPH BLD: NORMAL
REF LAB TEST METHOD: ABNORMAL
RSV RNA NPH QL NAA+NON-PROBE: NOT DETECTED
SARS-COV-2 RNA RESP QL NAA+PROBE: NOT DETECTED
SODIUM SERPL-SCNC: 137 MMOL/L (ref 136–145)
SP GR UR STRIP: 1.01 (ref 1–1.03)
SQUAMOUS #/AREA URNS HPF: ABNORMAL /HPF
TROPONIN T SERPL HS-MCNC: 13 NG/L
UROBILINOGEN UR QL STRIP: ABNORMAL
WBC # UR STRIP: ABNORMAL /HPF
WBC MORPH BLD: NORMAL
WBC NRBC COR # BLD AUTO: 13.44 10*3/MM3 (ref 3.4–10.8)
WHOLE BLOOD HOLD COAG: NORMAL
WHOLE BLOOD HOLD SPECIMEN: NORMAL
YEAST URNS QL MICRO: ABNORMAL /HPF

## 2024-01-10 PROCEDURE — 51702 INSERT TEMP BLADDER CATH: CPT

## 2024-01-10 PROCEDURE — 25010000002 CEFTRIAXONE PER 250 MG: Performed by: INTERNAL MEDICINE

## 2024-01-10 PROCEDURE — 87077 CULTURE AEROBIC IDENTIFY: CPT | Performed by: EMERGENCY MEDICINE

## 2024-01-10 PROCEDURE — 74177 CT ABD & PELVIS W/CONTRAST: CPT

## 2024-01-10 PROCEDURE — 85007 BL SMEAR W/DIFF WBC COUNT: CPT | Performed by: EMERGENCY MEDICINE

## 2024-01-10 PROCEDURE — 87040 BLOOD CULTURE FOR BACTERIA: CPT | Performed by: EMERGENCY MEDICINE

## 2024-01-10 PROCEDURE — 25510000001 IOPAMIDOL PER 1 ML: Performed by: EMERGENCY MEDICINE

## 2024-01-10 PROCEDURE — 87147 CULTURE TYPE IMMUNOLOGIC: CPT | Performed by: EMERGENCY MEDICINE

## 2024-01-10 PROCEDURE — 36415 COLL VENOUS BLD VENIPUNCTURE: CPT

## 2024-01-10 PROCEDURE — 74018 RADEX ABDOMEN 1 VIEW: CPT

## 2024-01-10 PROCEDURE — 25010000002 CEFTRIAXONE PER 250 MG: Performed by: EMERGENCY MEDICINE

## 2024-01-10 PROCEDURE — 80053 COMPREHEN METABOLIC PANEL: CPT | Performed by: EMERGENCY MEDICINE

## 2024-01-10 PROCEDURE — 71045 X-RAY EXAM CHEST 1 VIEW: CPT

## 2024-01-10 PROCEDURE — 83605 ASSAY OF LACTIC ACID: CPT | Performed by: EMERGENCY MEDICINE

## 2024-01-10 PROCEDURE — 85025 COMPLETE CBC W/AUTO DIFF WBC: CPT | Performed by: EMERGENCY MEDICINE

## 2024-01-10 PROCEDURE — 84484 ASSAY OF TROPONIN QUANT: CPT | Performed by: EMERGENCY MEDICINE

## 2024-01-10 PROCEDURE — 51798 US URINE CAPACITY MEASURE: CPT

## 2024-01-10 PROCEDURE — 25010000002 MIDAZOLAM HCL (PF) 10 MG/2ML SOLUTION: Performed by: EMERGENCY MEDICINE

## 2024-01-10 PROCEDURE — 99223 1ST HOSP IP/OBS HIGH 75: CPT | Performed by: INTERNAL MEDICINE

## 2024-01-10 PROCEDURE — 83735 ASSAY OF MAGNESIUM: CPT | Performed by: EMERGENCY MEDICINE

## 2024-01-10 PROCEDURE — 87086 URINE CULTURE/COLONY COUNT: CPT | Performed by: EMERGENCY MEDICINE

## 2024-01-10 PROCEDURE — 71260 CT THORAX DX C+: CPT

## 2024-01-10 PROCEDURE — 81001 URINALYSIS AUTO W/SCOPE: CPT | Performed by: EMERGENCY MEDICINE

## 2024-01-10 RX ORDER — MAG HYDROX/ALUMINUM HYD/SIMETH 400-400-40
1 SUSPENSION, ORAL (FINAL DOSE FORM) ORAL ONCE
Status: COMPLETED | OUTPATIENT
Start: 2024-01-10 | End: 2024-01-10

## 2024-01-10 RX ORDER — MIDAZOLAM HYDROCHLORIDE 10 MG/2ML
4 INJECTION, SOLUTION INTRAMUSCULAR; INTRAVENOUS ONCE
Status: COMPLETED | OUTPATIENT
Start: 2024-01-10 | End: 2024-01-10

## 2024-01-10 RX ORDER — SODIUM CHLORIDE 0.9 % (FLUSH) 0.9 %
10 SYRINGE (ML) INJECTION EVERY 12 HOURS SCHEDULED
Status: DISCONTINUED | OUTPATIENT
Start: 2024-01-10 | End: 2024-01-14 | Stop reason: HOSPADM

## 2024-01-10 RX ORDER — CHOLECALCIFEROL (VITAMIN D3) 125 MCG
5 CAPSULE ORAL NIGHTLY
Status: DISCONTINUED | OUTPATIENT
Start: 2024-01-10 | End: 2024-01-14 | Stop reason: HOSPADM

## 2024-01-10 RX ORDER — CLONAZEPAM 0.5 MG/1
2 TABLET ORAL NIGHTLY
Status: DISCONTINUED | OUTPATIENT
Start: 2024-01-10 | End: 2024-01-14 | Stop reason: HOSPADM

## 2024-01-10 RX ORDER — ACETAMINOPHEN 325 MG/1
650 TABLET ORAL EVERY 4 HOURS PRN
Status: DISCONTINUED | OUTPATIENT
Start: 2024-01-10 | End: 2024-01-14 | Stop reason: HOSPADM

## 2024-01-10 RX ORDER — MINERAL OIL 100 G/100G
1 OIL RECTAL ONCE AS NEEDED
Status: COMPLETED | OUTPATIENT
Start: 2024-01-10 | End: 2024-01-11

## 2024-01-10 RX ORDER — MIDAZOLAM HYDROCHLORIDE 2 MG/2ML
2 INJECTION, SOLUTION INTRAMUSCULAR; INTRAVENOUS ONCE
Status: DISCONTINUED | OUTPATIENT
Start: 2024-01-10 | End: 2024-01-10

## 2024-01-10 RX ORDER — SODIUM CHLORIDE 9 MG/ML
40 INJECTION, SOLUTION INTRAVENOUS AS NEEDED
Status: DISCONTINUED | OUTPATIENT
Start: 2024-01-10 | End: 2024-01-14 | Stop reason: HOSPADM

## 2024-01-10 RX ORDER — SODIUM CHLORIDE 0.9 % (FLUSH) 0.9 %
10 SYRINGE (ML) INJECTION AS NEEDED
Status: DISCONTINUED | OUTPATIENT
Start: 2024-01-10 | End: 2024-01-14 | Stop reason: HOSPADM

## 2024-01-10 RX ORDER — CEFTRIAXONE SODIUM 1 G/50ML
1000 INJECTION, SOLUTION INTRAVENOUS ONCE
Status: COMPLETED | OUTPATIENT
Start: 2024-01-10 | End: 2024-01-10

## 2024-01-10 RX ORDER — POTASSIUM CHLORIDE 1.5 G/1.58G
40 POWDER, FOR SOLUTION ORAL EVERY 4 HOURS
Status: COMPLETED | OUTPATIENT
Start: 2024-01-10 | End: 2024-01-10

## 2024-01-10 RX ORDER — MINERAL OIL 100 G/100G
1 OIL RECTAL ONCE AS NEEDED
Status: COMPLETED | OUTPATIENT
Start: 2024-01-10 | End: 2024-01-10

## 2024-01-10 RX ORDER — ONDANSETRON 4 MG/1
4 TABLET, ORALLY DISINTEGRATING ORAL EVERY 6 HOURS PRN
Status: DISCONTINUED | OUTPATIENT
Start: 2024-01-10 | End: 2024-01-14 | Stop reason: HOSPADM

## 2024-01-10 RX ORDER — CALCIUM CARBONATE 500 MG/1
1 TABLET, CHEWABLE ORAL 2 TIMES DAILY PRN
Status: DISCONTINUED | OUTPATIENT
Start: 2024-01-10 | End: 2024-01-14 | Stop reason: HOSPADM

## 2024-01-10 RX ADMIN — CLONAZEPAM 2 MG: 0.5 TABLET ORAL at 21:51

## 2024-01-10 RX ADMIN — POLYETHYLENE GLYCOL 3350, SODIUM SULFATE ANHYDROUS, SODIUM BICARBONATE, SODIUM CHLORIDE, POTASSIUM CHLORIDE 4000 ML: 236; 22.74; 6.74; 5.86; 2.97 POWDER, FOR SOLUTION ORAL at 10:43

## 2024-01-10 RX ADMIN — IOPAMIDOL 100 ML: 755 INJECTION, SOLUTION INTRAVENOUS at 06:51

## 2024-01-10 RX ADMIN — CEFTRIAXONE SODIUM 1000 MG: 1 INJECTION, POWDER, FOR SOLUTION INTRAMUSCULAR; INTRAVENOUS at 23:26

## 2024-01-10 RX ADMIN — CEFTRIAXONE SODIUM 1000 MG: 1 INJECTION, SOLUTION INTRAVENOUS at 07:32

## 2024-01-10 RX ADMIN — Medication 5 MG: at 21:51

## 2024-01-10 RX ADMIN — GLYCERIN 1 SUPPOSITORY: 2 SUPPOSITORY RECTAL at 10:40

## 2024-01-10 RX ADMIN — POTASSIUM CHLORIDE 40 MEQ: 1.5 POWDER, FOR SOLUTION ORAL at 15:21

## 2024-01-10 RX ADMIN — MIDAZOLAM HYDROCHLORIDE 4 MG: 5 INJECTION, SOLUTION INTRAMUSCULAR; INTRAVENOUS at 04:51

## 2024-01-10 RX ADMIN — ACETAMINOPHEN 650 MG: 325 TABLET ORAL at 09:23

## 2024-01-10 RX ADMIN — MINERAL OIL 1 ENEMA: 100 ENEMA RECTAL at 14:41

## 2024-01-10 RX ADMIN — Medication 10 ML: at 21:52

## 2024-01-10 RX ADMIN — POTASSIUM CHLORIDE 40 MEQ: 1.5 POWDER, FOR SOLUTION ORAL at 11:43

## 2024-01-10 NOTE — ED NOTES
"Per nightshift tech and RN, patient was \"too combative\" to collect EKG. EKG collected at 0731 without any issues from patient refusing. Dayshift RN and charge notified.   "

## 2024-01-10 NOTE — ED NOTES
Tried to place IV in Pt, Pt retracted arm and turned away. This RN attempted the other arm but Pt was unwilling to keep it exposed or let this RN near him. Mom stated this is typical behavior for him and it is best to try again later or look for alternate route for medication administration (If applicable)or blood draws at this time.  Provider notified. Awaiting new orders if any.

## 2024-01-10 NOTE — ED PROVIDER NOTES
"Time: 10:39 PM EST  Date of encounter:  1/9/2024  Independent Historian/Clinical History and Information was obtained by:   Family    History is limited by: Cognitive Impairment    Chief Complaint   Patient presents with    Urinary Retention    Constipation    Sore Throat         History of Present Illness:  Patient is a 53 y.o. year old male who presents to the emergency department for evaluation of constipation, possible urinary retention, pain with urination.  Patient has intellectual disability.  Caregiver reports decreased appetite, has not been eating very much since New Year's, also reports she believes he may have a sore throat and that it may hurt him when he eats.  Caregiver also reports he has a history of constipation, is not able to completely empty his bowels, has had some liquid stool but states there is still a large amount of stool present that he has been unable to pass.  Caregiver also reports he seems to be holding his urine, she believes it may be painful to urinate, he is only urinating in small amounts at a time. (BRAYAN Narayan, provider in triage)     Patient Care Team  Primary Care Provider: Leonardo Barraza Jr., MD    Past Medical History:     No Known Allergies  Past Medical History:   Diagnosis Date    Anxiety     Autism     Constipation     Drooling     Good tolerance for ambulation     WALKS AT HOME    Hyperactive     Insomnia     Mental disability     \"MENTAL RETARDATION\" DX AT AGE 4-5    Nonverbal     Teeth missing      Past Surgical History:   Procedure Laterality Date    DENTAL EXAMINATION UNDER ANESTHESIA       History reviewed. No pertinent family history.    Home Medications:  Prior to Admission medications    Medication Sig Start Date End Date Taking? Authorizing Provider   clonazePAM (KlonoPIN) 2 MG tablet Take 1 tablet by mouth every night at bedtime. 12/22/23   Leonardo Barraza Jr., MD   glycopyrrolate (ROBINUL) 2 MG tablet Take 2 tablets by mouth 2 (Two) " "Times a Day. 11/30/23   Leonardo Barraza Jr., MD   Melatonin 3 MG capsule Take 1 capsule by mouth Every Night. 5/18/23   Leonardo Barraza Jr., MD   meloxicam (MOBIC) 15 MG tablet Take 1 tablet by mouth Daily As Needed for Moderate Pain. 5/18/23   Leonardo Barraza Jr., MD   polyethylene glycol (MIRALAX) 17 GM/SCOOP powder Take 17 g by mouth Daily As Needed (for constipation). 5/18/23   Leonardo Barraza Jr., MD        Social History:   Social History     Tobacco Use    Smoking status: Never    Smokeless tobacco: Never   Vaping Use    Vaping Use: Never used   Substance Use Topics    Alcohol use: Never    Drug use: Never         Review of Systems:  Review of Systems   Unable to perform ROS: Patient nonverbal   Constitutional:  Positive for appetite change.   Gastrointestinal:  Positive for constipation.        Physical Exam:  /71 (BP Location: Right arm, Patient Position: Lying)   Pulse 89   Temp 98.1 °F (36.7 °C) (Oral)   Resp 18   Ht 172.7 cm (68\")   Wt 70.7 kg (155 lb 13.8 oz)   SpO2 94%   BMI 23.70 kg/m²         Physical Exam  Constitutional:       Appearance: Normal appearance.   HENT:      Head: Normocephalic and atraumatic.      Nose: No congestion.      Mouth/Throat:      Mouth: Mucous membranes are moist.   Eyes:      Extraocular Movements: Extraocular movements intact.      Conjunctiva/sclera: Conjunctivae normal.      Pupils: Pupils are equal, round, and reactive to light.   Cardiovascular:      Rate and Rhythm: Regular rhythm. Tachycardia present.   Pulmonary:      Effort: Pulmonary effort is normal.   Abdominal:      General: There is distension.      Tenderness: There is abdominal tenderness.   Skin:     General: Skin is warm.      Coloration: Skin is not cyanotic.   Neurological:      Mental Status: He is alert and oriented to person, place, and time.   Psychiatric:         Attention and Perception: Attention and perception normal.         Mood and Affect: Mood " normal.                      Procedures:  Procedures      Medical Decision Making:      Comorbidities that affect care:    Autism, mental disability    External Notes reviewed:    Previous Clinic Note: Patient was seen by his PCP on 11/30/2023 for anxiety, constipation and drooling      The following orders were placed and all results were independently analyzed by me:  Orders Placed This Encounter   Procedures    Rapid Strep A Screen - Swab, Throat    COVID-19, FLU A/B, RSV PCR 1 HR TAT - Swab, Nasopharynx    Beta Strep Culture, Throat - Swab, Throat    Blood Culture - Blood,    Blood Culture - Blood,    Urine Culture - Urine, Urine, Clean Catch    XR Chest 1 View    XR Abdomen 1 View    CT Chest With Contrast Diagnostic    CT Abdomen Pelvis With Contrast    [place] Draw    Comprehensive Metabolic Panel    Single High Sensitivity Troponin T    Magnesium    Urinalysis With Microscopic If Indicated (No Culture) - Urine, Clean Catch    CBC Auto Differential    Scan Slide    Urinalysis, Microscopic Only - Urine, Clean Catch    Lactic Acid, Plasma    CBC Auto Differential    Magnesium    Phosphorus    Comprehensive Metabolic Panel    Diet: Liquid Diets; Clear Liquid; Fluid Consistency: Thin (IDDSI 0)    Undress & Gown    Continuous Pulse Oximetry    Vital Signs    Bladder scan    Vital Signs    Notify Provider (With Default Parameters)    Intake & Output    Weigh Patient    Oral Care    Tobacco Cessation Education    Place Sequential Compression Device    Maintain Sequential Compression Device    Telemetry - Maintain IV Access    Activity - Ad Vania    Disimpaction    Please provide the golytely prep slowly through the day  Nursing Communication    Bladder Scan    Insert Indwelling Urinary Catheter    Assess Need for Indwelling Urinary Catheter - Follow Removal Protocol    Urinary Catheter Care    Code Status and Medical Interventions:    Inpatient Nutrition Consult    Incentive Spirometry    ECG 12 Lead ED Triage  Standing Order; Weak / Dizzy / AMS    Insert Peripheral IV    Insert Peripheral IV    Inpatient Admission    CBC & Differential    Green Top (Gel)    Lavender Top    Gold Top - SST    Light Blue Top       Medications Given in the Emergency Department:  Medications   sodium chloride 0.9 % flush 10 mL (10 mL Intravenous Given 1/10/24 2152)   sodium chloride 0.9 % flush 10 mL (has no administration in time range)   sodium chloride 0.9 % infusion 40 mL (has no administration in time range)   acetaminophen (TYLENOL) tablet 650 mg (650 mg Oral Given 1/10/24 0923)   calcium carbonate (TUMS) chewable tablet 500 mg (200 mg elemental) (has no administration in time range)   ondansetron ODT (ZOFRAN-ODT) disintegrating tablet 4 mg (has no administration in time range)   cefTRIAXone (ROCEPHIN) 1000 mg/50 mL 0.9% sodium chloride MBP (1,000 mg Intravenous New Bag 1/10/24 2326)   clonazePAM (KlonoPIN) tablet 2 mg (2 mg Oral Given 1/10/24 2151)   melatonin tablet 5 mg (5 mg Oral Given 1/10/24 2151)   potassium chloride (KLOR-CON) packet 40 mEq (has no administration in time range)   sennosides-docusate (PERICOLACE) 8.6-50 MG per tablet 1 tablet (has no administration in time range)     And   polyethylene glycol (MIRALAX) packet 17 g (has no administration in time range)   Midazolam HCl (PF) (VERSED) injection INTRANASAL 4 mg (4 mg Nasal Given 1/10/24 0451)   cefTRIAXone (ROCEPHIN) IVPB 1,000 mg (0 mg Intravenous Stopped 1/10/24 0802)   iopamidol (ISOVUE-370) 76 % injection 100 mL (100 mL Intravenous Given 1/10/24 0651)   polyethylene glycol (GoLYTELY) solution 4,000 mL (4,000 mL Oral Given 1/10/24 1043)   glycerin adult 1 suppository (1 suppository Rectal Given 1/10/24 1040)   mineral oil enema 1 enema (1 enema Rectal Given 1/10/24 1441)   potassium chloride (KLOR-CON) packet 40 mEq (40 mEq Oral Given 1/10/24 1521)   mineral oil enema 1 enema (1 enema Rectal Given 1/11/24 0551)        ED Course:    The patient was initially  evaluated in the triage area where orders were placed. The patient was later dispositioned by Marvin Randolph MD.      The patient was advised to stay for completion of workup which includes but is not limited to communication of labs and radiological results, reassessment and plan. The patient was advised that leaving prior to disposition by a provider could result in critical findings that are not communicated to the patient.          Labs:    Lab Results (last 24 hours)       Procedure Component Value Units Date/Time    Urine Culture - Urine, Indwelling Urethral Catheter [[id number]] Collected: 01/10/24 0729    Specimen: Urine from Indwelling Urethral Catheter Updated: 01/10/24 0735    CBC Auto Differential [[id number]]  (Abnormal) Collected: 01/11/24 0519    Specimen: Blood from Arm, Right Updated: 01/11/24 0603     WBC 13.42 10*3/mm3      RBC 3.78 10*6/mm3      Hemoglobin 11.0 g/dL      Hematocrit 32.7 %      MCV 86.5 fL      MCH 29.1 pg      MCHC 33.6 g/dL      RDW 11.8 %      RDW-SD 37.6 fl      MPV 10.8 fL      Platelets 171 10*3/mm3      Neutrophil % 75.1 %      Lymphocyte % 8.1 %      Monocyte % 16.1 %      Eosinophil % 0.0 %      Basophil % 0.2 %      Immature Grans % 0.5 %      Neutrophils, Absolute 10.07 10*3/mm3      Lymphocytes, Absolute 1.09 10*3/mm3      Monocytes, Absolute 2.16 10*3/mm3      Eosinophils, Absolute 0.00 10*3/mm3      Basophils, Absolute 0.03 10*3/mm3      Immature Grans, Absolute 0.07 10*3/mm3      nRBC 0.0 /100 WBC     Magnesium [[id number]]  (Normal) Collected: 01/11/24 0519    Specimen: Blood from Arm, Right Updated: 01/11/24 0612     Magnesium 2.2 mg/dL     Phosphorus [[id number]]  (Normal) Collected: 01/11/24 0519    Specimen: Blood from Arm, Right Updated: 01/11/24 0612     Phosphorus 3.0 mg/dL     Comprehensive Metabolic Panel [[id number]]  (Abnormal) Collected: 01/11/24 0519    Specimen: Blood from Arm, Right Updated: 01/11/24 0612     Glucose 132 mg/dL      BUN 12 mg/dL       Creatinine 1.14 mg/dL      Sodium 137 mmol/L      Potassium 3.4 mmol/L      Chloride 100 mmol/L      CO2 26.7 mmol/L      Calcium 8.6 mg/dL      Total Protein 7.0 g/dL      Albumin 3.1 g/dL      ALT (SGPT) 13 U/L      AST (SGOT) 19 U/L      Alkaline Phosphatase 65 U/L      Total Bilirubin 0.8 mg/dL      Globulin 3.9 gm/dL      A/G Ratio 0.8 g/dL      BUN/Creatinine Ratio 10.5     Anion Gap 10.3 mmol/L      eGFR 77.4 mL/min/1.73     Narrative:      GFR Normal >60  Chronic Kidney Disease <60  Kidney Failure <15               Imaging:    No Radiology Exams Resulted Within Past 24 Hours      Differential Diagnosis and Discussion:      Abdominal Pain: Based on the patient's signs and symptoms, I considered abdominal aortic aneurysm, small bowel obstruction, pancreatitis, acute cholecystitis, acute appendecitis, peptic ulcer disease, gastritis, colitis, endocrine disorders, irritable bowel syndrome and other differential diagnosis an etiology of the patient's abdominal pain.  Dysuria: Differential diagnosis includes but is not limited to urethritis, cystitis, pyelonephritis, ureteral calculi, neoplasm, chemical irritant, urethral stricture, and trauma    All labs were reviewed and interpreted by me.  All X-rays impressions were independently interpreted by me.  CT scan radiology impression was interpreted by me.    MDM  Number of Diagnoses or Management Options  Diagnosis management comments: On arrival vital signs remarkable for tachycardia.  History obtained from patient's caregiver.  Patient is unable to give history.  Patient had a bladder scan that showed greater than 999 mL of urine.  Garner catheter was placed.  UA consistent with urinary tract infection.  Since patient not able to give much history CTs were ordered.  CT showed constipation. With urinary retention and urinary infection discussed patient with hospitalist and will admit for further care.         Amount and/or Complexity of Data Reviewed  Clinical lab  tests: reviewed  Tests in the radiology section of CPT®: reviewed  Tests in the medicine section of CPT®: reviewed  Review and summarize past medical records: yes  Independent visualization of images, tracings, or specimens: yes    Risk of Complications, Morbidity, and/or Mortality  Presenting problems: moderate  Management options: moderate                 Patient Care Considerations:    CT HEAD: I considered ordering a noncontrast CT of the head, however patient at baseline      Consultants/Shared Management Plan:    Hospitalist: I have discussed the case with Hospitalist who agrees to accept the patient for admission.    Social Determinants of Health:    Patient has presented with family members who are responsible, reliable and will ensure follow up care.      Disposition and Care Coordination:    Admit:   Through independent evaluation of the patient's history, physical, and imperical data, the patient meets criteria for observation/admission to the hospital.        Final diagnoses:   Fecal impaction   Urinary tract infection without hematuria, site unspecified   Urinary retention        ED Disposition       ED Disposition   Decision to Admit    Condition   --    Comment   Level of Care: Remote Telemetry [26]   Diagnosis: Fecal impaction [[id number]]   Admitting Physician: LEÓN ROWLEY [[id number]]   Attending Physician: LEÓN ROWLEY [[id number]]   Certification: I Certify That Inpatient Hospital Services Are Medically Necessary For Greater Than 2 Midnights                 This medical record created using voice recognition software.             Marvin Randolph MD  01/11/24 0735

## 2024-01-10 NOTE — PLAN OF CARE
Goal Outcome Evaluation:    ALERT, CALM, & COOPERATIVE. NONVERBAL (BASELINE). TOLERATING DIET & CLEAR LIQUID DIET. SOFT BROWN STOOLS RESULTED AFTER MD ORDERED INTERVENTIONS. F/C TO BSD.   MOM (DORA) AT BEDSIDE.  PATIENT GOES BY THE NAME LA NENA.

## 2024-01-10 NOTE — PAYOR COMM NOTE
"Lien Evans (53 y.o. Male)       Date of Birth   1970    Social Security Number       Address   [address] [place] [address]    Home Phone   [phone]    MRN   [id number]       Jewish   Jain    Marital Status   Single                            Admission Date   1/10/24    Admission Type   Emergency    Admitting Provider   Walter Osman MD    Attending Provider   Walter Osman MD    Department, Room/Bed   [address] AMBULATORY SERVICES, 367/1       Discharge Date       Discharge Disposition       Discharge Destination                                 Attending Provider: Walter Osman MD    Allergies: No Known Allergies    Isolation: None   Infection: None   Code Status: CPR    Ht: 172.7 cm (68\")   Wt: 70.7 kg (155 lb 13.8 oz)    Admission Cmt: None   Principal Problem: Fecal impaction [K56.41]                   Active Insurance as of 1/10/2024       Primary Coverage       Payor Plan Insurance Group Employer/Plan Group    [facility] MEDICARE REPLACEMENT [facility] MED ADV SNP HMO [id number]       Payor Plan Address Payor Plan Phone Number Payor Plan Fax Number Effective Dates    [address] [address]   1/1/2023 - None Entered    [facility] [id number]         Subscriber Name Subscriber Birth Date Member ID       LIEN EVANS 1970 [id number]               Secondary Coverage       Payor Plan Insurance Group Employer/Plan Group    [place]        Payor Plan Address Payor Plan Phone Number Payor Plan Fax Number Effective Dates    [address] [phone]  6/2/2021 - None Entered    [facility] [id number]         Subscriber Name Subscriber Birth Date Member ID       LIEN EVANS 1970 [id number]                     Emergency Contacts        (Rel.) Home Phone Work Phone Mobile Phone    DORA MEDINA (Mother) [phone] -- [phone]    VERNON PEOPLES (Sister) [phone] -- [phone]        AUTHORIZATION " "REQUEST for EMERGENCY DEPARTMENT ADMISSION        PATIENT INFORMATION  Name:             Luis Evans  MRN#:            [id number]        ADMISSION INFORMATION  CLASS:          Inpatient   DOS:               1/10/2024        ADMISSION DIAGNOSIS AND HOSPITAL PROBLEMS    Problems Addressed this Visit    None  Diagnoses    None.              ADMITTING PROVIDER INFORMATION  Name/NPI       Walter Osman MD [[id number]]  Phone:            Phone: [phone]        RENDERING FACILITY  Name:             [facility]   NPI:                 [id number]  TID:                 [id number]  Address:        [facility] [address] [place] [address]        CASE MANAGEMENT CONTACT INFORMATION  Name:             TARIK Harris  Phone:            [phone]  Fax:                [phone]      The site would not let us complete_______________________+++++           Emergency Department Notes        Jennifer Cárdenas RN at 01/10/24 0725          2650 mL of urine drained.     Electronically signed by Jennifer Cárdenas RN at 01/10/24 0725       Magi Torres, PCT at 01/10/24 0710          Per nightshift tech and RN, patient was \"too combative\" to collect EKG. EKG collected at 0731 without any issues from patient refusing. Dayshift RN and charge notified.     Electronically signed by Magi Torres, PCT at 01/10/24 0737       Crystal Mccurdy, RN at 01/10/24 0551          2300 ml output post urinary catheter placement    Electronically signed by Crystal Mccurdy RN at 01/10/24 0552       Crystal Mccurdy RN at 01/10/24 0530          Bladder scan shows >999 ml    Electronically signed by Crystal Mccurdy RN at 01/10/24 0530       Crystal Mccurdy, RN at 01/10/24 0239          Tried to place IV in Pt, Pt retracted arm and turned away. This RN attempted the other arm but Pt was unwilling to keep it exposed or let this RN near him. Mom stated this is typical behavior for him and it is best to try again later or look for alternate route for " medication administration (If applicable)or blood draws at this time.  Provider notified. Awaiting new orders if any.     Electronically signed by Crystal Mccurdy RN at 01/10/24 0240       Nae Taylor RN at 01/09/24 2248          Labs and EKG attempted in triage. Pt is uncooperative in obtaining labs and EKG in wheelchair.     Triage nurse also collected strep swab but pt was biting swab and nurse could not get accurate specimen. Triage nurse sent to lab anyway.     Electronically signed by Nae Taylor RN at 01/09/24 6741       Nae Taylor RN at 01/09/24 2238          Pt comes to the ER tonight for constipation and diarrhea. Family states that he only goes a little bit at a time and its diarrhea. Pt also is having urinary retention. Pt is also mentally disabled. Triage was unable to get a tempeture out in triage.     Electronically signed by Nae Taylor RN at 01/09/24 4143

## 2024-01-10 NOTE — H&P
[facility]   HOSPITALIST HISTORY AND PHYSICAL  Date: 1/10/2024   Patient Name: Luis Evans  : 1970  MRN: [id number]  Primary Care Physician:  Leonardo Barraza Jr., MD  Date of admission: 1/10/2024    Subjective   Subjective     Chief Complaint:   Urinary retention, constipation    HPI:    Luis Evans is a 53 y.o. male with a past medical history significant for cognitive impairment, nonverbal at baseline.  Able to meet with patient's mother at bedside.  She has concerns for urinary retention as well as constipation.  Patient has been with decreasing oral intake over the past few weeks.  Patient without a regular bowel movement for a couple of weeks.  Patient's mother states he has tried going to the bathroom straining however with no bowel movement.  Also having difficulty with urination.  In the ED patient required straight catheterization, bladder scan showed greater than 9 9 9 mL of urine.  KUB obtained showing a large stool burden.  Follow-up CT scan of abdomen shows an extremely large pancolonic stool burden with likely fecal impaction within the rectum.  Patient has bilateral hydronephrosis and hydroureter.  Labs were significant for a potassium of 3, creatinine of 1.3, no baseline creatinine on record.  Patient has a white count of 13.  UA concerning for urinary tract infection.  Patient also found to be febrile with a Tmax of 39.1 in the ED.  Hospitalist contacted for admission.      Personal History     Past Medical History:  Past Medical History:   Diagnosis Date    Anxiety     Drooling        Past Surgical History:  History reviewed. No pertinent surgical history.    Family History:   History reviewed. No pertinent family history.    Social History:   Social History     Socioeconomic History    Marital status: Single   Tobacco Use    Smoking status: Never    Smokeless tobacco: Never   Vaping Use    Vaping Use: Never used   Substance and Sexual Activity    Alcohol use: Never     Drug use: Never    Sexual activity: Never       Home Medications:  Melatonin, clonazePAM, glycopyrrolate, meloxicam, and polyethylene glycol    Allergies:  No Known Allergies    Objective   Objective     Vitals:   Temp:  [101.1 °F (38.4 °C)-102.4 °F (39.1 °C)] 101.1 °F (38.4 °C)  Heart Rate:  [] 94  Resp:  [20-26] 26  BP: (125-150)/(64-81) 125/78    Physical Exam    Constitutional: Awake, alert, nonverbal at baseline, making eye contact   Eyes: Pupils equal, sclerae anicteric, no conjunctival injection   HENT: NCAT, mucous membranes moist   Neck: Supple, no thyromegaly, no lymphadenopathy, trachea midline   Respiratory: Clear to auscultation bilaterally, nonlabored respirations    Cardiovascular: Tachycardic with regular rhythm, no murmurs, rubs, or gallops, palpable pedal pulses bilaterally   Gastrointestinal: Distended, does not appear tender to palpation   Musculoskeletal: Trace bilateral ankle edema, no clubbing or cyanosis to extremities   Neurologic: Awake alert, nonverbal, observed moving all 4 extremities    Result Review    Result Review:  I have personally reviewed the results from the time of this admission to 1/10/2024 13:47 EST and agree with these findings:  [x]  Laboratory  [x]  Microbiology  [x]  Radiology  []  EKG/Telemetry   []  Cardiology/Vascular   []  Pathology  []  Old records  []  Other:      Assessment & Plan   Assessment / Plan     Assessment/Plan:   Fecal impaction  Constipation  Cognitive impairment, nonverbal at baseline  Urinary retention  UTI    Plan:  Patient will be admitted for further care and management  Will slowly work on providing medications to help patient pass large amount of stool seen on CT.  Concern patient could have complications if medications provided to quickly.  Ordering GoLytely prep, this will be provided slowly through the day  Suppository ordered  Mineral oral enema also ordered as needed  Patient with a clear liquid diet available  Replacing  potassium  Will provide ceftriaxone for treatment of UTI, follow-up urine culture    DVT prophylaxis:  Mechanical DVT prophylaxis orders are present.    CODE STATUS:    Code Status (Patient has no pulse and is not breathing): CPR (Attempt to Resuscitate)  Medical Interventions (Patient has pulse or is breathing): Full Support      Admission Status:  I believe this patient meets patient status.    Electronically signed by Walter Osman MD, 01/10/24, 1:47 PM EST.

## 2024-01-10 NOTE — ED NOTES
Labs and EKG attempted in triage. Pt is uncooperative in obtaining labs and EKG in wheelchair.     Triage nurse also collected strep swab but pt was biting swab and nurse could not get accurate specimen. Triage nurse sent to lab anyway.

## 2024-01-10 NOTE — ED TRIAGE NOTES
Pt comes to the ER tonight for constipation and diarrhea. Family states that he only goes a little bit at a time and its diarrhea. Pt also is having urinary retention. Pt is also mentally disabled. Triage was unable to get a tempeture out in triage.

## 2024-01-10 NOTE — PAYOR COMM NOTE
"[facility] DEPARTMENT    Alena Dsouza RN  Phone [phone]  Fax [phone]    [address] [place] [address]    NPI [id number]  TAX ID [id number]    PHYSICIAN NAME AND NPI   LEÓN OSMAN  [id number]    BED TYPE  INPATIENT REMOTE TELEMETRY    TYPE OF ADMISSION: ED ADMISSION MEDICAL    ICD 10 CODE  N39.0, K56.41    DATE OF ADMISSION 01/10/2024      Lien Conner (53 y.o. Male)       Date of Birth   1970    Social Security Number       Address   [address] [facility] [facility] [address]    Home Phone   [phone]    MRN   [id number]       [place]   [employer]    Marital Status   Single                            Admission Date   1/10/24    Admission Type   Emergency    Admitting Provider   León Osman MD    Attending Provider   León Osman MD    Department, Room/Bed   [address] AMBULATORY SERVICES, 367/1       Discharge Date       Discharge Disposition       Discharge Destination                                 Attending Provider: León Osman MD    Allergies: No Known Allergies    Isolation: None   Infection: None   Code Status: CPR    Ht: 172.7 cm (68\")   Wt: 70.7 kg (155 lb 13.8 oz)    Admission Cmt: None   Principal Problem: Fecal impaction [K56.41]                   Active Insurance as of 1/10/2024       Primary Coverage       Payor Plan Insurance Group Employer/Plan Group    [facility] MEDICARE REPLACEMENT [facility] MED ADV SNP HMO [id number]       Payor Plan Address Payor Plan Phone Number Payor Plan Fax Number Effective Dates    [address] [address]   1/1/2023 - None Entered    [facility] [id number]         Subscriber Name Subscriber Birth Date Member ID       LIEN CONNER 1970 [id number]               Secondary Coverage       Payor Plan Insurance Group Employer/Plan Group    [place]        Payor Plan Address Payor Plan Phone Number Payor Plan Fax Number Effective Dates    [address] [phone]  " 6/2/2021 - None Entered    [facility] [id number]         Subscriber Name Subscriber Birth Date Member ID       LIEN CONNER 1970 [id number]                     Emergency Contacts        (Rel.) Home Phone Work Phone Mobile Phone    DORA MEDINA (Mother) [phone] -- [phone]    VERNON PEOPLES (Sister) [phone] -- [phone]           Urinary Tract Infection (UTI) [facility]       Indications Met   Last updated by Alena Dsouza on 1/10/2024 1304     Review Status Created By   Primary Completed Alena Dsouza      Criteria Review   Urinary Tract Infection (UTI) [facility]     Overall Determination: Indications Met     Criteria:  [×] Admission is indicated for  1 or more  of the following :      [×] Hemodynamic instability          1/10/2024  1:04 PM              -- 1/10/2024  1:04 PM by Alena Dsouza --                                    (X) Hemodynamic instability, as indicated by  1 or more  of the following  (1) (2) (3) (4) (5) (6):                  (X) Vital sign abnormality not readily corrected by appropriate treatment, as indicated by  1 or more  of the following  [A]:                  (X) Tachycardia that persists despite appropriate treatment (eg, volume repletion, treatment of pain, treatment of underlying cause)          1/10/2024  1:04 PM              -- 1/10/2024  1:04 PM by Alena Dsouza --                  HR remains elevated at 114 despite having received IV Rocephin     Notes:  -- 1/10/2024  1:04 PM by Alena Dsouza --      Presented to ER for  evaluation of constipation, possible urinary retention, pain with urination. Patient has intellectual disability. Caregiver reports decreased appetite, has not been eating very much since New Year's, also reports she believes he may have a sore throat and that it may hurt him when he eats. Caregiver also reports he has a history of constipation, is not able to completely empty his bowels, has had some liquid stool but states  there is still a large amount of stool present that he has been unable to pass. Caregiver also reports he seems to be holding his urine, she believes it may be painful to urinate, he is only urinating in small amounts at a time. Initial VS Temp 102.4, , resp 20, /73.  CT abd-  Extremely large pancolonic stool burden with likely fecal impaction within the rectum. Bilateral hydronephrosis and hydroureter. Bladder is partially decompressed with Garner.       Circumferential bladder wall thickening likely related to cystitis or neurogenic bladder.  WBC 13.44, Potassium 3.0, Urine- 1+ leukocyte esterase, positive for nitrite and 1+ bacteria. Received IV Rocephin in ER.                  Orders- Klor-con po for 2 doses, glycerin suppository, Golytely, Zofran prn, Clear liqid diet, CBC and CMP am                      [place]: NPI [id number] Tax ID [id number]     History & Physical        ObWalter baeza MD at 01/10/24 [address] [place], [place] [address]IST HISTORY AND PHYSICAL  Date: 1/10/2024   Patient Name: Luis Evans  : 1970  MRN: [id number]  Primary Care Physician:  Leonardo Barraza Jr., MD  Date of admission: 1/10/2024    Subjective  Subjective     Chief Complaint:   Urinary retention, constipation    HPI:    Luis Evans is a 53 y.o. male with a past medical history significant for cognitive impairment, nonverbal at baseline.  Able to meet with patient's mother at bedside.  She has concerns for urinary retention as well as constipation.  Patient has been with decreasing oral intake over the past few weeks.  Patient without a regular bowel movement for a couple of weeks.  Patient's mother states he has tried going to the bathroom straining however with no bowel movement.  Also having difficulty with urination.  In the ED patient required straight catheterization, bladder scan showed greater than 9 9 9 mL of urine.  KUB obtained showing a large stool burden.  Follow-up CT scan of  abdomen shows an extremely large pancolonic stool burden with likely fecal impaction within the rectum.  Patient has bilateral hydronephrosis and hydroureter.  Labs were significant for a potassium of 3, creatinine of 1.3, no baseline creatinine on record.  Patient has a white count of 13.  UA concerning for urinary tract infection.  Patient also found to be febrile with a Tmax of 39.1 in the ED.  Hospitalist contacted for admission.      Personal History     Past Medical History:  Past Medical History:   Diagnosis Date   • Anxiety    • Drooling        Past Surgical History:  History reviewed. No pertinent surgical history.    Family History:   History reviewed. No pertinent family history.    Social History:   Social History     Socioeconomic History   • Marital status: Single   Tobacco Use   • Smoking status: Never   • Smokeless tobacco: Never   Vaping Use   • Vaping Use: Never used   Substance and Sexual Activity   • Alcohol use: Never   • Drug use: Never   • Sexual activity: Never       Home Medications:  Melatonin, clonazePAM, glycopyrrolate, meloxicam, and polyethylene glycol    Allergies:  No Known Allergies    Objective  Objective     Vitals:   Temp:  [101.1 °F (38.4 °C)-102.4 °F (39.1 °C)] 101.1 °F (38.4 °C)  Heart Rate:  [] 94  Resp:  [20-26] 26  BP: (125-150)/(64-81) 125/78    Physical Exam    Constitutional: Awake, alert, nonverbal at baseline, making eye contact   Eyes: Pupils equal, sclerae anicteric, no conjunctival injection   HENT: NCAT, mucous membranes moist   Neck: Supple, no thyromegaly, no lymphadenopathy, trachea midline   Respiratory: Clear to auscultation bilaterally, nonlabored respirations    Cardiovascular: Tachycardic with regular rhythm, no murmurs, rubs, or gallops, palpable pedal pulses bilaterally   Gastrointestinal: Distended, does not appear tender to palpation   Musculoskeletal: Trace bilateral ankle edema, no clubbing or cyanosis to extremities   Neurologic: Awake alert,  "nonverbal, observed moving all 4 extremities    Result Review   Result Review:  I have personally reviewed the results from the time of this admission to 1/10/2024 13:47 EST and agree with these findings:  [x]  Laboratory  [x]  Microbiology  [x]  Radiology  []  EKG/Telemetry   []  Cardiology/Vascular   []  Pathology  []  Old records  []  Other:      Assessment & Plan  Assessment / Plan     Assessment/Plan:   Fecal impaction  Constipation  Cognitive impairment, nonverbal at baseline  Urinary retention  UTI    Plan:  Patient will be admitted for further care and management  Will slowly work on providing medications to help patient pass large amount of stool seen on CT.  Concern patient could have complications if medications provided to quickly.  Ordering GoLytely prep, this will be provided slowly through the day  Suppository ordered  Mineral oral enema also ordered as needed  Patient with a clear liquid diet available  Replacing potassium  Will provide ceftriaxone for treatment of UTI, follow-up urine culture    DVT prophylaxis:  Mechanical DVT prophylaxis orders are present.    CODE STATUS:    Code Status (Patient has no pulse and is not breathing): CPR (Attempt to Resuscitate)  Medical Interventions (Patient has pulse or is breathing): Full Support      Admission Status:  I believe this patient meets patient status.    Electronically signed by Walter Osman MD, 01/10/24, 1:47 PM EST.              Electronically signed by Walter Omsan MD at 01/10/24 1405          Emergency Department Notes        Jennifer Cárdenas RN at 01/10/24 0725          2650 mL of urine drained.     Electronically signed by Jennifer Cárdenas RN at 01/10/24 0725       Magi Torres PCT at 01/10/24 0710          Per nightshift tech and RN, patient was \"too combative\" to collect EKG. EKG collected at 0731 without any issues from patient refusing. Dayshift RN and charge notified.     Electronically signed by Magi Torres PCT at 01/10/24 " 0737       Crystal Mccurdy RN at 01/10/24 0551          2300 ml output post urinary catheter placement    Electronically signed by Crystal Mccurdy RN at 01/10/24 0552       Crystal Mccurdy RN at 01/10/24 0530          Bladder scan shows >999 ml    Electronically signed by Crystal Mccurdy RN at 01/10/24 0530       Crystal Mccurdy, RN at 01/10/24 0239          Tried to place IV in Pt, Pt retracted arm and turned away. This RN attempted the other arm but Pt was unwilling to keep it exposed or let this RN near him. Mom stated this is typical behavior for him and it is best to try again later or look for alternate route for medication administration (If applicable)or blood draws at this time.  Provider notified. Awaiting new orders if any.     Electronically signed by Crystal Mccurdy RN at 01/10/24 0240       Nae Taylor RN at 01/09/24 2248          Labs and EKG attempted in triage. Pt is uncooperative in obtaining labs and EKG in wheelchair.     Triage nurse also collected strep swab but pt was biting swab and nurse could not get accurate specimen. Triage nurse sent to lab anyway.     Electronically signed by Nae Taylor RN at 01/09/24 2251       Nae Taylor RN at 01/09/24 2236          Pt comes to the ER tonight for constipation and diarrhea. Family states that he only goes a little bit at a time and its diarrhea. Pt also is having urinary retention. Pt is also mentally disabled. Triage was unable to get a tempeture out in triage.     Electronically signed by Nae Taylor RN at 01/09/24 2237       Current Facility-Administered Medications   Medication Dose Route Frequency Provider Last Rate Last Admin   • acetaminophen (TYLENOL) tablet 650 mg  650 mg Oral Q4H PRN Walter Osman MD   650 mg at 01/10/24 0923   • calcium carbonate (TUMS) chewable tablet 500 mg (200 mg elemental)  1 tablet Oral BID PRN Walter Osman MD       • [START ON 1/11/2024] cefTRIAXone (ROCEPHIN) 1000 mg/50 mL 0.9% sodium chloride MBP   1,000 mg Intravenous Q24H Walter Osman MD       • clonazePAM (KlonoPIN) tablet 2 mg  2 mg Oral Nightly Walter Osman MD       • melatonin tablet 5 mg  5 mg Oral Nightly Walter Osman MD       • mineral oil enema 1 enema  1 enema Rectal Once PRN Walter Osman MD       • ondansetron ODT (ZOFRAN-ODT) disintegrating tablet 4 mg  4 mg Oral Q6H PRN Walter Osman MD       • potassium chloride (KLOR-CON) packet 40 mEq  40 mEq Oral Q4H Walter Osman MD   40 mEq at 01/10/24 1143   • sodium chloride 0.9 % flush 10 mL  10 mL Intravenous Q12H Walter Osman MD       • sodium chloride 0.9 % flush 10 mL  10 mL Intravenous PRN Walter Osman MD       • sodium chloride 0.9 % infusion 40 mL  40 mL Intravenous PRN Walter Osman MD         Physician Progress Notes (last 24 hours)  Notes from 01/09/24 1421 through 01/10/24 1421   No notes of this type exist for this encounter.       Consult Notes (last 24 hours)  Notes from 01/09/24 1421 through 01/10/24 1421   No notes of this type exist for this encounter.

## 2024-01-11 LAB
ALBUMIN SERPL-MCNC: 3.1 G/DL (ref 3.5–5.2)
ALBUMIN/GLOB SERPL: 0.8 G/DL
ALP SERPL-CCNC: 65 U/L (ref 39–117)
ALT SERPL W P-5'-P-CCNC: 13 U/L (ref 1–41)
ANION GAP SERPL CALCULATED.3IONS-SCNC: 10.3 MMOL/L (ref 5–15)
AST SERPL-CCNC: 19 U/L (ref 1–40)
BASOPHILS # BLD AUTO: 0.03 10*3/MM3 (ref 0–0.2)
BASOPHILS NFR BLD AUTO: 0.2 % (ref 0–1.5)
BILIRUB SERPL-MCNC: 0.8 MG/DL (ref 0–1.2)
BUN SERPL-MCNC: 12 MG/DL (ref 6–20)
BUN/CREAT SERPL: 10.5 (ref 7–25)
CALCIUM SPEC-SCNC: 8.6 MG/DL (ref 8.6–10.5)
CHLORIDE SERPL-SCNC: 100 MMOL/L (ref 98–107)
CO2 SERPL-SCNC: 26.7 MMOL/L (ref 22–29)
CREAT SERPL-MCNC: 1.14 MG/DL (ref 0.76–1.27)
DEPRECATED RDW RBC AUTO: 37.6 FL (ref 37–54)
EGFRCR SERPLBLD CKD-EPI 2021: 77.4 ML/MIN/1.73
EOSINOPHIL # BLD AUTO: 0 10*3/MM3 (ref 0–0.4)
EOSINOPHIL NFR BLD AUTO: 0 % (ref 0.3–6.2)
ERYTHROCYTE [DISTWIDTH] IN BLOOD BY AUTOMATED COUNT: 11.8 % (ref 12.3–15.4)
GLOBULIN UR ELPH-MCNC: 3.9 GM/DL
GLUCOSE SERPL-MCNC: 132 MG/DL (ref 65–99)
HCT VFR BLD AUTO: 32.7 % (ref 37.5–51)
HGB BLD-MCNC: 11 G/DL (ref 13–17.7)
IMM GRANULOCYTES # BLD AUTO: 0.07 10*3/MM3 (ref 0–0.05)
IMM GRANULOCYTES NFR BLD AUTO: 0.5 % (ref 0–0.5)
LYMPHOCYTES # BLD AUTO: 1.09 10*3/MM3 (ref 0.7–3.1)
LYMPHOCYTES NFR BLD AUTO: 8.1 % (ref 19.6–45.3)
MAGNESIUM SERPL-MCNC: 2.2 MG/DL (ref 1.6–2.6)
MCH RBC QN AUTO: 29.1 PG (ref 26.6–33)
MCHC RBC AUTO-ENTMCNC: 33.6 G/DL (ref 31.5–35.7)
MCV RBC AUTO: 86.5 FL (ref 79–97)
MONOCYTES # BLD AUTO: 2.16 10*3/MM3 (ref 0.1–0.9)
MONOCYTES NFR BLD AUTO: 16.1 % (ref 5–12)
NEUTROPHILS NFR BLD AUTO: 10.07 10*3/MM3 (ref 1.7–7)
NEUTROPHILS NFR BLD AUTO: 75.1 % (ref 42.7–76)
NRBC BLD AUTO-RTO: 0 /100 WBC (ref 0–0.2)
PHOSPHATE SERPL-MCNC: 3 MG/DL (ref 2.5–4.5)
PLATELET # BLD AUTO: 171 10*3/MM3 (ref 140–450)
PMV BLD AUTO: 10.8 FL (ref 6–12)
POTASSIUM SERPL-SCNC: 3.4 MMOL/L (ref 3.5–5.2)
PROT SERPL-MCNC: 7 G/DL (ref 6–8.5)
RBC # BLD AUTO: 3.78 10*6/MM3 (ref 4.14–5.8)
SODIUM SERPL-SCNC: 137 MMOL/L (ref 136–145)
WBC NRBC COR # BLD AUTO: 13.42 10*3/MM3 (ref 3.4–10.8)

## 2024-01-11 PROCEDURE — 84100 ASSAY OF PHOSPHORUS: CPT | Performed by: INTERNAL MEDICINE

## 2024-01-11 PROCEDURE — 80053 COMPREHEN METABOLIC PANEL: CPT | Performed by: INTERNAL MEDICINE

## 2024-01-11 PROCEDURE — 83735 ASSAY OF MAGNESIUM: CPT | Performed by: INTERNAL MEDICINE

## 2024-01-11 PROCEDURE — 85025 COMPLETE CBC W/AUTO DIFF WBC: CPT | Performed by: INTERNAL MEDICINE

## 2024-01-11 PROCEDURE — 99232 SBSQ HOSP IP/OBS MODERATE 35: CPT | Performed by: INTERNAL MEDICINE

## 2024-01-11 PROCEDURE — 36415 COLL VENOUS BLD VENIPUNCTURE: CPT | Performed by: INTERNAL MEDICINE

## 2024-01-11 RX ORDER — MINERAL OIL 100 G/100G
1 OIL RECTAL ONCE AS NEEDED
Status: COMPLETED | OUTPATIENT
Start: 2024-01-11 | End: 2024-01-11

## 2024-01-11 RX ORDER — MINERAL OIL 100 G/100G
1 OIL RECTAL ONCE AS NEEDED
Status: DISCONTINUED | OUTPATIENT
Start: 2024-01-11 | End: 2024-01-12

## 2024-01-11 RX ORDER — AMOXICILLIN 250 MG
1 CAPSULE ORAL 2 TIMES DAILY
Status: DISCONTINUED | OUTPATIENT
Start: 2024-01-11 | End: 2024-01-14

## 2024-01-11 RX ORDER — POLYETHYLENE GLYCOL 3350 17 G/17G
17 POWDER, FOR SOLUTION ORAL EVERY 6 HOURS
Status: DISCONTINUED | OUTPATIENT
Start: 2024-01-11 | End: 2024-01-14

## 2024-01-11 RX ORDER — POTASSIUM CHLORIDE 1.5 G/1.58G
40 POWDER, FOR SOLUTION ORAL EVERY 4 HOURS
Status: COMPLETED | OUTPATIENT
Start: 2024-01-11 | End: 2024-01-11

## 2024-01-11 RX ADMIN — DOCUSATE SODIUM 50MG AND SENNOSIDES 8.6MG 1 TABLET: 8.6; 5 TABLET, FILM COATED ORAL at 08:10

## 2024-01-11 RX ADMIN — POTASSIUM CHLORIDE 40 MEQ: 1.5 POWDER, FOR SOLUTION ORAL at 11:50

## 2024-01-11 RX ADMIN — POLYETHYLENE GLYCOL 3350 17 G: 17 POWDER, FOR SOLUTION ORAL at 14:00

## 2024-01-11 RX ADMIN — Medication 10 ML: at 20:43

## 2024-01-11 RX ADMIN — POLYETHYLENE GLYCOL 3350 17 G: 17 POWDER, FOR SOLUTION ORAL at 20:42

## 2024-01-11 RX ADMIN — CLONAZEPAM 2 MG: 0.5 TABLET ORAL at 20:42

## 2024-01-11 RX ADMIN — Medication 10 ML: at 08:11

## 2024-01-11 RX ADMIN — POLYETHYLENE GLYCOL 3350 17 G: 17 POWDER, FOR SOLUTION ORAL at 08:11

## 2024-01-11 RX ADMIN — Medication 5 MG: at 20:42

## 2024-01-11 RX ADMIN — MINERAL OIL 1 ENEMA: 100 ENEMA RECTAL at 10:58

## 2024-01-11 RX ADMIN — MINERAL OIL 1 ENEMA: 100 ENEMA RECTAL at 05:51

## 2024-01-11 RX ADMIN — POTASSIUM CHLORIDE 40 MEQ: 1.5 POWDER, FOR SOLUTION ORAL at 08:11

## 2024-01-11 RX ADMIN — DOCUSATE SODIUM 50MG AND SENNOSIDES 8.6MG 1 TABLET: 8.6; 5 TABLET, FILM COATED ORAL at 20:42

## 2024-01-11 NOTE — PLAN OF CARE
Goal Outcome Evaluation:      Patient is alert and follows commands. Nonverbal at baseline. No complaints or signs of pain on shift. Patient continues drinking the GoLytely throughout shift and did have small bm x1. Garner remains in place. All medications given per mar. No significant changes or events to report at this time. Mother remains at bedside. Plan of care ongoing.

## 2024-01-11 NOTE — PROGRESS NOTES
[facility]   Hospitalist Progress Note  Date: 2024  Patient Name: Luis Evans  : 1970  MRN: [id number]  Date of admission: 1/10/2024  Room/Bed: [facility]/      Subjective   Subjective     Chief Complaint:   Urinary retention, constipation    Summary:    Luis Evans is a 53 y.o. male with a past medical history significant for cognitive impairment, nonverbal at baseline.  Able to meet with patient's mother at bedside.  She has concerns for urinary retention as well as constipation.  Patient has been with decreasing oral intake over the past few weeks.  Patient without a regular bowel movement for a couple of weeks.  Patient's mother states he has tried going to the bathroom straining however with no bowel movement.  Also having difficulty with urination.  In the ED patient required straight catheterization, bladder scan showed greater than 9 9 9 mL of urine.  KUB obtained showing a large stool burden.  Follow-up CT scan of abdomen shows an extremely large pancolonic stool burden with likely fecal impaction within the rectum.  Patient has bilateral hydronephrosis and hydroureter.  Labs were significant for a potassium of 3, creatinine of 1.3, no baseline creatinine on record.  Patient has a white count of 13.  UA concerning for urinary tract infection.  Patient also found to be febrile with a Tmax of 39.1 in the ED.  Hospitalist contacted for admission.     Interval Followup:   Patient has been afebrile.  Small bowel movements however still clearly retaining a large amount of stool.  Talked with nurse and will have patient finish his GoLytely prep.  Scheduling additional oral medications and providing additional enema.  Will have patient up and ambulating as able.  Patient does not need to use a bedpan        Objective   Objective     Vitals:   Temp:  [97.6 °F (36.4 °C)-99.9 °F (37.7 °C)] 97.7 °F (36.5 °C)  Heart Rate:  [89] 89  Resp:  [18-19] 18  BP: (124-140)/(60-85) 124/60    Physical Exam                Constitutional: Awake, alert, nonverbal at baseline, making eye contact              Eyes: Pupils equal, sclerae anicteric, no conjunctival injection              HENT: NCAT, mucous membranes moist              Neck: Supple, no thyromegaly, no lymphadenopathy, trachea midline              Respiratory: Clear to auscultation bilaterally, nonlabored respirations               Cardiovascular: RRR, no murmurs, rubs, or gallops, palpable pedal pulses bilaterally              Gastrointestinal: Distended, does not appear tender to palpation              Musculoskeletal: Trace bilateral ankle edema, no clubbing or cyanosis to extremities              Neurologic: Awake alert, nonverbal, observed moving all 4 extremities    Result Review    Result Review:  I have personally reviewed these results:  [x]  Laboratory      Lab 01/11/24  0519 01/10/24  0728 01/10/24  0504   WBC 13.42*  --  13.44*   HEMOGLOBIN 11.0*  --  11.5*   HEMATOCRIT 32.7*  --  34.4*   PLATELETS 171  --  159   NEUTROS ABS 10.07*  --  10.24*   IMMATURE GRANS (ABS) 0.07*  --  0.05   LYMPHS ABS 1.09  --  1.03   MONOS ABS 2.16*  --  2.03*   EOS ABS 0.00  --  0.05   MCV 86.5  --  87.1   LACTATE  --  1.6  --          Lab 01/11/24  0519 01/10/24  0504   SODIUM 137 137   POTASSIUM 3.4* 3.0*   CHLORIDE 100 99   CO2 26.7 21.4*   ANION GAP 10.3 16.6*   BUN 12 15   CREATININE 1.14 1.32*   EGFR 77.4 64.9   GLUCOSE 132* 126*   CALCIUM 8.6 8.6   MAGNESIUM 2.2 2.3   PHOSPHORUS 3.0  --          Lab 01/11/24  0519 01/10/24  0504   TOTAL PROTEIN 7.0 7.3   ALBUMIN 3.1* 3.4*   GLOBULIN 3.9 3.9   ALT (SGPT) 13 7   AST (SGOT) 19 17   BILIRUBIN 0.8 1.0   ALK PHOS 65 68         Lab 01/10/24  0504   HSTROP T 13                 Brief Urine Lab Results  (Last result in the past 365 days)        Color   Clarity   Blood   Leuk Est   Nitrite   Protein   CREAT   Urine HCG        01/10/24 0549 Yellow   Clear   Trace   Small (1+)   Positive   Trace                 [x]  Microbiology    Microbiology Results (last 10 days)       Procedure Component Value - Date/Time    Urine Culture - Urine, Indwelling Urethral Catheter [[id number]]  (Abnormal) Collected: 01/10/24 0729    Lab Status: Preliminary result Specimen: Urine from Indwelling Urethral Catheter Updated: 01/11/24 0859     Urine Culture >100,000 CFU/mL Gram Positive Cocci    Narrative:      Colonization of the urinary tract without infection is common. Treatment is discouraged unless the patient is symptomatic, pregnant, or undergoing an invasive urologic procedure.    Blood Culture - Blood, Arm, Left [[id number]]  (Normal) Collected: 01/10/24 0728    Lab Status: Preliminary result Specimen: Blood from Arm, Left Updated: 01/11/24 0745     Blood Culture No growth at 24 hours    Blood Culture - Blood, Arm, Left [[id number]]  (Normal) Collected: 01/10/24 0728    Lab Status: Preliminary result Specimen: Blood from Arm, Left Updated: 01/11/24 0745     Blood Culture No growth at 24 hours    Rapid Strep A Screen - Swab, Throat [[id number]]  (Normal) Collected: 01/09/24 2243    Lab Status: Final result Specimen: Swab from Throat Updated: 01/09/24 2314     Strep A Ag Negative    COVID-19, FLU A/B, RSV PCR 1 HR TAT - Swab, Nasopharynx [[id number]]  (Normal) Collected: 01/09/24 2243    Lab Status: Final result Specimen: Swab from Nasopharynx Updated: 01/10/24 0009     COVID19 Not Detected     Influenza A PCR Not Detected     Influenza B PCR Not Detected     RSV, PCR Not Detected    Narrative:      Fact sheet for providers: https://www.fda.gov/media/449694/download    Fact sheet for patients: https://www.fda.gov/media/053366/download    Test performed by PCR.    Beta Strep Culture, Throat - Swab, Throat [[id number]]  (Normal) Collected: 01/09/24 2243    Lab Status: Preliminary result Specimen: Swab from Throat Updated: 01/11/24 0913     Throat Culture, Beta Strep No Beta Hemolytic Streptococcus Isolated    Narrative:      Group A Strep incidence is low in  adults. Positive culture for Beta hemolytic Streptococcus species can reflect colonization and not true infection. Please correlate clinically.          [x]  Radiology  CT Abdomen Pelvis With Contrast    Result Date: 1/10/2024    1. Extremely large pancolonic stool burden with likely fecal impaction within the rectum. 2. Distal esophageal wall thickening and small hiatal hernia.  Findings likely reflect reflux. 3. Bilateral hydronephrosis and hydroureter.  Bladder is partially decompressed with Garner.  Circumferential bladder wall thickening likely related to cystitis or neurogenic bladder.      DESTINY LAMB MD       Electronically Signed and Approved By: DESTINY LAMB MD on 1/10/2024 at 7:15             CT Chest With Contrast Diagnostic    Result Date: 1/10/2024    1. No evidence of pulmonary embolism within the main or lobar pulmonary arteries.  Limited assessment of the segmental and subsegmental branches secondary to breathing motion artifact. 2. Mild dependent positional atelectasis. 3. Esophageal wall thickening involving the mid and distal esophagus.  Suspect esophagitis.  Correlate clinically for upper GI symptoms with consideration for GI follow-up.     DESTINY LAMB MD       Electronically Signed and Approved By: DESTINY LAMB MD on 1/10/2024 at 7:09             XR Abdomen 1 View    Result Date: 1/10/2024   A large stool burden is suggested radiographically.  There is possible constipation.     Please note that portions of this note were completed with a voice recognition program.  COURTNEY JONES JR, MD       Electronically Signed and Approved By: COURTNEY JONES JR, MD on 1/10/2024 at 1:46              XR Chest 1 View    Result Date: 1/10/2024   There is pulmonary hypoinflation.  There may be a small right pleural effusion.  No definite focal infiltrate.  No cardiomediastinal enlargement.  No pneumothorax.  There may be a large stool burden.  Please see above comments for further detail.       Please note that portions of this note were completed with a voice recognition program.  COURTNEY JONES JR, MD       Electronically Signed and Approved By: COURTNEY JONES JR, MD on 1/10/2024 at 1:44             []  EKG/Telemetry   []  Cardiology/Vascular   []  Pathology  []  Old records  []  Other:    Assessment & Plan   Assessment / Plan     Assessment:  Fecal impaction  Constipation  Cognitive impairment, nonverbal at baseline  Urinary retention  UTI     Plan:  Patient will be admitted for further care and management  Continue to slowly work on providing medications to clear out patient's fecal impaction  Discussed with nurse, still need to complete GoLytely prep  Additional mineral oil enema ordered.  Replacing potassium today, recheck in the morning  Starting Tori-Colace and additional MiraLAX  Continue with a clear liquid diet for now  Will provide ceftriaxone for treatment of UTI, follow-up urine culture       Discussed with RN.    DVT prophylaxis:  Mechanical DVT prophylaxis orders are present.    CODE STATUS:   Code Status (Patient has no pulse and is not breathing): CPR (Attempt to Resuscitate)  Medical Interventions (Patient has pulse or is breathing): Full Support

## 2024-01-11 NOTE — PLAN OF CARE
Goal Outcome Evaluation:  Plan of Care Reviewed With: patient, mother        Progress: improving     Patient alert, pleasant with staff. VSS. IV access remains patent. Mother at bedside and attentive to patient. No increased shortness of air or pain noted. Garner catheter draining dark yellow urine. Several incontinent, soft stools noted throughout this shift. No acute distress noted at this time.

## 2024-01-12 LAB
ANION GAP SERPL CALCULATED.3IONS-SCNC: 9 MMOL/L (ref 5–15)
BACTERIA SPEC AEROBE CULT: ABNORMAL
BACTERIA SPEC AEROBE CULT: NORMAL
BUN SERPL-MCNC: 10 MG/DL (ref 6–20)
BUN/CREAT SERPL: 9.4 (ref 7–25)
CALCIUM SPEC-SCNC: 8.8 MG/DL (ref 8.6–10.5)
CHLORIDE SERPL-SCNC: 101 MMOL/L (ref 98–107)
CO2 SERPL-SCNC: 27 MMOL/L (ref 22–29)
CREAT SERPL-MCNC: 1.06 MG/DL (ref 0.76–1.27)
DEPRECATED RDW RBC AUTO: 39.8 FL (ref 37–54)
EGFRCR SERPLBLD CKD-EPI 2021: 84.4 ML/MIN/1.73
ERYTHROCYTE [DISTWIDTH] IN BLOOD BY AUTOMATED COUNT: 11.8 % (ref 12.3–15.4)
GLUCOSE SERPL-MCNC: 160 MG/DL (ref 65–99)
HCT VFR BLD AUTO: 34.5 % (ref 37.5–51)
HGB BLD-MCNC: 10.9 G/DL (ref 13–17.7)
MAGNESIUM SERPL-MCNC: 2.2 MG/DL (ref 1.6–2.6)
MCH RBC QN AUTO: 29 PG (ref 26.6–33)
MCHC RBC AUTO-ENTMCNC: 31.6 G/DL (ref 31.5–35.7)
MCV RBC AUTO: 91.8 FL (ref 79–97)
PHOSPHATE SERPL-MCNC: 2.3 MG/DL (ref 2.5–4.5)
PLATELET # BLD AUTO: 206 10*3/MM3 (ref 140–450)
PMV BLD AUTO: 10.6 FL (ref 6–12)
POTASSIUM SERPL-SCNC: 4.1 MMOL/L (ref 3.5–5.2)
RBC # BLD AUTO: 3.76 10*6/MM3 (ref 4.14–5.8)
SODIUM SERPL-SCNC: 137 MMOL/L (ref 136–145)
WBC NRBC COR # BLD AUTO: 9.39 10*3/MM3 (ref 3.4–10.8)

## 2024-01-12 PROCEDURE — 84100 ASSAY OF PHOSPHORUS: CPT | Performed by: INTERNAL MEDICINE

## 2024-01-12 PROCEDURE — 85027 COMPLETE CBC AUTOMATED: CPT | Performed by: INTERNAL MEDICINE

## 2024-01-12 PROCEDURE — 80048 BASIC METABOLIC PNL TOTAL CA: CPT | Performed by: INTERNAL MEDICINE

## 2024-01-12 PROCEDURE — 83735 ASSAY OF MAGNESIUM: CPT | Performed by: INTERNAL MEDICINE

## 2024-01-12 PROCEDURE — 25010000002 CEFTRIAXONE PER 250 MG: Performed by: INTERNAL MEDICINE

## 2024-01-12 PROCEDURE — 99232 SBSQ HOSP IP/OBS MODERATE 35: CPT | Performed by: INTERNAL MEDICINE

## 2024-01-12 RX ADMIN — DOCUSATE SODIUM 50MG AND SENNOSIDES 8.6MG 1 TABLET: 8.6; 5 TABLET, FILM COATED ORAL at 20:38

## 2024-01-12 RX ADMIN — CEFTRIAXONE SODIUM 1000 MG: 1 INJECTION, POWDER, FOR SOLUTION INTRAMUSCULAR; INTRAVENOUS at 00:59

## 2024-01-12 RX ADMIN — DOCUSATE SODIUM 50MG AND SENNOSIDES 8.6MG 1 TABLET: 8.6; 5 TABLET, FILM COATED ORAL at 09:01

## 2024-01-12 RX ADMIN — POLYETHYLENE GLYCOL 3350 17 G: 17 POWDER, FOR SOLUTION ORAL at 09:00

## 2024-01-12 RX ADMIN — Medication 5 MG: at 20:38

## 2024-01-12 RX ADMIN — Medication 10 ML: at 20:38

## 2024-01-12 RX ADMIN — CLONAZEPAM 2 MG: 0.5 TABLET ORAL at 20:38

## 2024-01-12 RX ADMIN — POLYETHYLENE GLYCOL 3350 17 G: 17 POWDER, FOR SOLUTION ORAL at 01:00

## 2024-01-12 RX ADMIN — POLYETHYLENE GLYCOL 3350 17 G: 17 POWDER, FOR SOLUTION ORAL at 20:38

## 2024-01-12 RX ADMIN — Medication 10 ML: at 09:01

## 2024-01-12 RX ADMIN — POLYETHYLENE GLYCOL 3350 17 G: 17 POWDER, FOR SOLUTION ORAL at 15:00

## 2024-01-12 NOTE — PLAN OF CARE
Goal Outcome Evaluation:     Continues to have frequent bowel movements. Soap suds enema given per MD order. Resting comfortably in bed. VSS. Mother at bedside.

## 2024-01-12 NOTE — PLAN OF CARE
Goal Outcome Evaluation:      Patient alert, nonverbal on shift. Mother remained at bedside. VSS. Patient had x3 BM on shift, soft. All needs met at this time. Plan of care ongoing.

## 2024-01-12 NOTE — CONSULTS
"Nutrition Services    Patient Name: Luis Evans  YOB: 1970  MRN: [id number]  Admission date: 1/10/2024      CLINICAL NUTRITION ASSESSMENT      Reason for Assessment  Physician Consult and MST Score 2+     H&P:  Past Medical History:   Diagnosis Date    Anxiety     Autism     Constipation     Drooling     Good tolerance for ambulation     WALKS AT HOME    Hyperactive     Insomnia     Mental disability     \"MENTAL RETARDATION\" DX AT AGE 4-5    Nonverbal     Teeth missing         Current Problems:   Active Hospital Problems    Diagnosis     **Fecal impaction         Nutrition/Diet History         Narrative   Patient admitted with fecal impaction, constipation.  Patient has a cognitive impairment and is nonverbal at baseline.  Mother reports patient has had decreased appetite prior to admission.  Mother reports patient does not like ONS, has tried in the past.  Agreeable to adding Magic Cup with meals.      Patient had a bowel movement at time of RD visit and was being cleaned by nursing staff.  NFPE deferred at this time.  Will follow to complete NFPE as able.       Anthropometrics        Current Height, Weight Height: 172.7 cm (68\")  Weight: 70.7 kg (155 lb 13.8 oz)   Current BMI Body mass index is 23.7 kg/m².   BMI Classification Normal range   % %   Adjusted Body Weight (ABW)    Weight Hx  Wt Readings from Last 30 Encounters:   01/10/24 0724 70.7 kg (155 lb 13.8 oz)   11/30/23 1101 69 kg (152 lb 3.2 oz)   11/22/23 0937 66.3 kg (146 lb 3.2 oz)   11/20/23 0202 61.8 kg (136 lb 3.9 oz)   08/18/23 1109 70.3 kg (155 lb)   05/18/23 1036 72 kg (158 lb 12.8 oz)   12/05/22 1625 82 kg (180 lb 12.8 oz)   08/02/22 1559 87 kg (191 lb 12.8 oz)   03/28/22 1622 92.4 kg (203 lb 12.8 oz)   11/30/21 1637 93.6 kg (206 lb 6.4 oz)   08/27/21 1330 96.8 kg (213 lb 6.4 oz)   05/11/21 0000 99 kg (218 lb 4 oz)   08/02/18 0000 86.9 kg (191 lb 8 oz)   02/15/18 0000 92.1 kg (203 lb)          Wt Change Observation -13.8% " "x 13 months      Estimated/Assessed Needs  Estimated Needs based on: Current Body Weight       Energy Requirements 30 kcal/kg    EST Needs (kcal/day) 2121       Protein Requirements 1.0 g/kg   EST Daily Needs (g/day) 71       Fluid Requirements 30 ml/kg    Estimated Needs (mL/day) 2121     Labs/Medications         Pertinent Labs Reviewed.   Results from last 7 days   Lab Units 01/12/24  1115 01/11/24  0519 01/10/24  0504   SODIUM mmol/L 137 137 137   POTASSIUM mmol/L 4.1 3.4* 3.0*   CHLORIDE mmol/L 101 100 99   CO2 mmol/L 27.0 26.7 21.4*   BUN mg/dL 10 12 15   CREATININE mg/dL 1.06 1.14 1.32*   CALCIUM mg/dL 8.8 8.6 8.6   BILIRUBIN mg/dL  --  0.8 1.0   ALK PHOS U/L  --  65 68   ALT (SGPT) U/L  --  13 7   AST (SGOT) U/L  --  19 17   GLUCOSE mg/dL 160* 132* 126*     Results from last 7 days   Lab Units 01/12/24  1115 01/11/24  0519 01/10/24  0504 01/10/24  0504   MAGNESIUM mg/dL 2.2 2.2  --  2.3   PHOSPHORUS mg/dL 2.3* 3.0   < >  --    HEMOGLOBIN g/dL 10.9* 11.0*  --  11.5*   HEMATOCRIT % 34.5* 32.7*  --  34.4*    < > = values in this interval not displayed.     COVID19   Date Value Ref Range Status   01/09/2024 Not Detected Not Detected - Ref. Range Final     No results found for: \"HGBA1C\"      Pertinent Medications Reviewed.     Malnutrition Severity Assessment              Nutrition Diagnosis         Nutrition Dx Problem 1 Unintentional weight loss related to decreased ability to consume sufficient energy as evidenced by  13.8% total body weight loss x 3 months.     Nutrition Intervention           Current Nutrition Orders & Evaluation of Intake       Current PO Diet Diet: Regular/House Diet; Texture: Soft to Chew (NDD 3); Soft to Chew: Ground Meat; Fluid Consistency: Thin (IDDSI 0)   Supplement No active supplement orders           Nutrition Intervention/Prescription        Magic Cup TID         Medical Nutrition Therapy/Nutrition Education          Learner     Readiness Family  Acceptance     Method     Response " Explanation  Verbalizes understanding     Monitor/Evaluation        Monitor Per protocol, PO intake, Supplement intake, Pertinent labs, Weight     Nutrition Discharge Plan         Recommend to continue oral nutrition supplements on discharge.      Electronically signed by:  Roslyn Paez RD  01/12/24 14:00 EST

## 2024-01-12 NOTE — PROGRESS NOTES
[facility]   Hospitalist Progress Note  Date: 2024  Patient Name: Luis Evans  : 1970  MRN: [id number]  Date of admission: 1/10/2024  Room/Bed: [facility]/      Subjective   Subjective     Chief Complaint:   Urinary retention, constipation    Summary:    Luis Evans is a 53 y.o. male with a past medical history significant for cognitive impairment, nonverbal at baseline.  Able to meet with patient's mother at bedside.  She has concerns for urinary retention as well as constipation.  Patient has been with decreasing oral intake over the past few weeks.  Patient without a regular bowel movement for a couple of weeks.  Patient's mother states he has tried going to the bathroom straining however with no bowel movement.  Also having difficulty with urination.  In the ED patient required straight catheterization, bladder scan showed greater than 9 9 9 mL of urine.  KUB obtained showing a large stool burden.  Follow-up CT scan of abdomen shows an extremely large pancolonic stool burden with likely fecal impaction within the rectum.  Patient has bilateral hydronephrosis and hydroureter.  Labs were significant for a potassium of 3, creatinine of 1.3, no baseline creatinine on record.  Patient has a white count of 13.  UA concerning for urinary tract infection.  Patient also found to be febrile with a Tmax of 39.1 in the ED.  Hospitalist contacted for admission.     Interval Followup:   Starting to have more formed bowel movements.  3 documented overnight.  Discussed with bedside nurse and the large bowel movement already today.  Bowel movements remain soft.  Will provide additional enema today.  Will complete GoLytely prep today.  Remains on scheduled bowel regiment.  Advancing diet as patient has been eating with no nausea no vomiting.    Objective   Objective     Vitals:   Temp:  [97.4 °F (36.3 °C)-98.4 °F (36.9 °C)] 97.4 °F (36.3 °C)  Heart Rate:  [110-115] 114  Resp:  [18] 18  BP: ()/(54-74)  130/68    Physical Exam               Constitutional: Awake, alert, nonverbal at baseline, making eye contact, tracks through the room              Eyes: Pupils equal, sclerae anicteric, no conjunctival injection              HENT: NCAT, mucous membranes moist              Neck: Supple, no thyromegaly, no lymphadenopathy, trachea midline              Respiratory: Clear to auscultation bilaterally, nonlabored respirations               Cardiovascular: RRR, no murmurs, rubs, or gallops, palpable pedal pulses bilaterally              Gastrointestinal: Distended on exam today, bowel sounds present, does not appear tender on palpation              Musculoskeletal: Trace bilateral ankle edema, no clubbing or cyanosis to extremities              Neurologic: Awake alert, nonverbal, observed moving all 4 extremities    Result Review    Result Review:  I have personally reviewed these results:  [x]  Laboratory      Lab 01/12/24  1115 01/11/24  0519 01/10/24  0728 01/10/24  0504   WBC 9.39 13.42*  --  13.44*   HEMOGLOBIN 10.9* 11.0*  --  11.5*   HEMATOCRIT 34.5* 32.7*  --  34.4*   PLATELETS 206 171  --  159   NEUTROS ABS  --  10.07*  --  10.24*   IMMATURE GRANS (ABS)  --  0.07*  --  0.05   LYMPHS ABS  --  1.09  --  1.03   MONOS ABS  --  2.16*  --  2.03*   EOS ABS  --  0.00  --  0.05   MCV 91.8 86.5  --  87.1   LACTATE  --   --  1.6  --          Lab 01/11/24  0519 01/10/24  0504   SODIUM 137 137   POTASSIUM 3.4* 3.0*   CHLORIDE 100 99   CO2 26.7 21.4*   ANION GAP 10.3 16.6*   BUN 12 15   CREATININE 1.14 1.32*   EGFR 77.4 64.9   GLUCOSE 132* 126*   CALCIUM 8.6 8.6   MAGNESIUM 2.2 2.3   PHOSPHORUS 3.0  --          Lab 01/11/24  0519 01/10/24  0504   TOTAL PROTEIN 7.0 7.3   ALBUMIN 3.1* 3.4*   GLOBULIN 3.9 3.9   ALT (SGPT) 13 7   AST (SGOT) 19 17   BILIRUBIN 0.8 1.0   ALK PHOS 65 68         Lab 01/10/24  0504   HSTROP T 13                 Brief Urine Lab Results  (Last result in the past 365 days)        Color   Clarity   Blood    Leuk Est   Nitrite   Protein   CREAT   Urine HCG        01/10/24 0549 Yellow   Clear   Trace   Small (1+)   Positive   Trace                 [x]  Microbiology   Microbiology Results (last 10 days)       Procedure Component Value - Date/Time    Urine Culture - Urine, Indwelling Urethral Catheter [[id number]]  (Abnormal) Collected: 01/10/24 0729    Lab Status: Final result Specimen: Urine from Indwelling Urethral Catheter Updated: 01/12/24 0420     Urine Culture >100,000 CFU/mL Staphylococcus, coagulase negative    Narrative:      Colonization of the urinary tract without infection is common. Treatment is discouraged unless the patient is symptomatic, pregnant, or undergoing an invasive urologic procedure.  By laboratory criteria, additional testing is not indicated and unlikely to produce clinically relevant information. Coagulase negative staphylococci are common skin contaminants, members of the oral geraldo, and of low virulence; requires clinical correlation.    Blood Culture - Blood, Arm, Left [[id number]]  (Normal) Collected: 01/10/24 0728    Lab Status: Preliminary result Specimen: Blood from Arm, Left Updated: 01/12/24 0746     Blood Culture No growth at 2 days    Blood Culture - Blood, Arm, Left [[id number]]  (Normal) Collected: 01/10/24 0728    Lab Status: Preliminary result Specimen: Blood from Arm, Left Updated: 01/12/24 0746     Blood Culture No growth at 2 days    Rapid Strep A Screen - Swab, Throat [[id number]]  (Normal) Collected: 01/09/24 2243    Lab Status: Final result Specimen: Swab from Throat Updated: 01/09/24 2314     Strep A Ag Negative    COVID-19, FLU A/B, RSV PCR 1 HR TAT - Swab, Nasopharynx [[id number]]  (Normal) Collected: 01/09/24 2243    Lab Status: Final result Specimen: Swab from Nasopharynx Updated: 01/10/24 0009     COVID19 Not Detected     Influenza A PCR Not Detected     Influenza B PCR Not Detected     RSV, PCR Not Detected    Narrative:      Fact sheet for providers:  https://www.fda.gov/media/853496/download    Fact sheet for patients: https://www.fda.gov/media/714478/download    Test performed by PCR.    Beta Strep Culture, Throat - Swab, Throat [[id number]]  (Normal) Collected: 01/09/24 2243    Lab Status: Final result Specimen: Swab from Throat Updated: 01/12/24 0750     Throat Culture, Beta Strep No Beta Hemolytic Streptococcus Isolated    Narrative:      Group A Strep incidence is low in adults. Positive culture for Beta hemolytic Streptococcus species can reflect colonization and not true infection. Please correlate clinically.          [x]  Radiology  CT Abdomen Pelvis With Contrast    Result Date: 1/10/2024    1. Extremely large pancolonic stool burden with likely fecal impaction within the rectum. 2. Distal esophageal wall thickening and small hiatal hernia.  Findings likely reflect reflux. 3. Bilateral hydronephrosis and hydroureter.  Bladder is partially decompressed with Garner.  Circumferential bladder wall thickening likely related to cystitis or neurogenic bladder.      DESTINY LAMB MD       Electronically Signed and Approved By: DESTINY LAMB MD on 1/10/2024 at 7:15             CT Chest With Contrast Diagnostic    Result Date: 1/10/2024    1. No evidence of pulmonary embolism within the main or lobar pulmonary arteries.  Limited assessment of the segmental and subsegmental branches secondary to breathing motion artifact. 2. Mild dependent positional atelectasis. 3. Esophageal wall thickening involving the mid and distal esophagus.  Suspect esophagitis.  Correlate clinically for upper GI symptoms with consideration for GI follow-up.     DESTINY LAMB MD       Electronically Signed and Approved By: DESTINY LAMB MD on 1/10/2024 at 7:09             XR Abdomen 1 View    Result Date: 1/10/2024   A large stool burden is suggested radiographically.  There is possible constipation.     Please note that portions of this note were completed with a voice  recognition program.  COURTNEY JONES JR, MD       Electronically Signed and Approved By: COURTNEY JONES JR, MD on 1/10/2024 at 1:46              XR Chest 1 View    Result Date: 1/10/2024   There is pulmonary hypoinflation.  There may be a small right pleural effusion.  No definite focal infiltrate.  No cardiomediastinal enlargement.  No pneumothorax.  There may be a large stool burden.  Please see above comments for further detail.      Please note that portions of this note were completed with a voice recognition program.  COURTNEY JONES JR, MD       Electronically Signed and Approved By: COURTNEY JONES JR, MD on 1/10/2024 at 1:44             []  EKG/Telemetry   []  Cardiology/Vascular   []  Pathology  []  Old records  []  Other:    Assessment & Plan   Assessment / Plan     Assessment:  Fecal impaction  Constipation  Cognitive impairment, nonverbal at baseline  Urinary retention  UTI     Plan:  Patient will be admitted for further care and management  Continue to slowly work on providing medications to clear out patient's fecal impaction  Discussed with nurse, still need to complete GoLytely prep, should complete today  Additional enema has been ordered, soapsuds  Still awaiting labs, will need to make sure potassium and magnesium are appropriate  Continue Tori-Colace and additional MiraLAX  Advancing diet today  Coag negative staph on UA therefore repeating, will continue antibiotics for now       Discussed with RN.  Family at bedside    DVT prophylaxis:  Mechanical DVT prophylaxis orders are present.    CODE STATUS:   Code Status (Patient has no pulse and is not breathing): CPR (Attempt to Resuscitate)  Medical Interventions (Patient has pulse or is breathing): Full Support

## 2024-01-13 LAB
ANION GAP SERPL CALCULATED.3IONS-SCNC: 8.7 MMOL/L (ref 5–15)
BACTERIA UR QL AUTO: ABNORMAL /HPF
BILIRUB UR QL STRIP: NEGATIVE
BUN SERPL-MCNC: 8 MG/DL (ref 6–20)
BUN/CREAT SERPL: 7.9 (ref 7–25)
CALCIUM SPEC-SCNC: 9 MG/DL (ref 8.6–10.5)
CHLORIDE SERPL-SCNC: 103 MMOL/L (ref 98–107)
CLARITY UR: CLEAR
CO2 SERPL-SCNC: 27.3 MMOL/L (ref 22–29)
COLOR UR: YELLOW
CREAT SERPL-MCNC: 1.01 MG/DL (ref 0.76–1.27)
DEPRECATED RDW RBC AUTO: 37.8 FL (ref 37–54)
EGFRCR SERPLBLD CKD-EPI 2021: 89.5 ML/MIN/1.73
ERYTHROCYTE [DISTWIDTH] IN BLOOD BY AUTOMATED COUNT: 11.7 % (ref 12.3–15.4)
GLUCOSE SERPL-MCNC: 130 MG/DL (ref 65–99)
GLUCOSE UR STRIP-MCNC: NEGATIVE MG/DL
HCT VFR BLD AUTO: 31.6 % (ref 37.5–51)
HGB BLD-MCNC: 10.5 G/DL (ref 13–17.7)
HGB UR QL STRIP.AUTO: ABNORMAL
HYALINE CASTS UR QL AUTO: ABNORMAL /LPF
KETONES UR QL STRIP: NEGATIVE
LEUKOCYTE ESTERASE UR QL STRIP.AUTO: ABNORMAL
MAGNESIUM SERPL-MCNC: 2 MG/DL (ref 1.6–2.6)
MCH RBC QN AUTO: 29.1 PG (ref 26.6–33)
MCHC RBC AUTO-ENTMCNC: 33.2 G/DL (ref 31.5–35.7)
MCV RBC AUTO: 87.5 FL (ref 79–97)
NITRITE UR QL STRIP: NEGATIVE
PH UR STRIP.AUTO: 8 [PH] (ref 5–8)
PHOSPHATE SERPL-MCNC: 3.4 MG/DL (ref 2.5–4.5)
PLATELET # BLD AUTO: 230 10*3/MM3 (ref 140–450)
PMV BLD AUTO: 10.2 FL (ref 6–12)
POTASSIUM SERPL-SCNC: 3.9 MMOL/L (ref 3.5–5.2)
PROT UR QL STRIP: ABNORMAL
RBC # BLD AUTO: 3.61 10*6/MM3 (ref 4.14–5.8)
RBC # UR STRIP: ABNORMAL /HPF
REF LAB TEST METHOD: ABNORMAL
SODIUM SERPL-SCNC: 139 MMOL/L (ref 136–145)
SP GR UR STRIP: 1.01 (ref 1–1.03)
SQUAMOUS #/AREA URNS HPF: ABNORMAL /HPF
UROBILINOGEN UR QL STRIP: ABNORMAL
WBC # UR STRIP: ABNORMAL /HPF
WBC NRBC COR # BLD AUTO: 8.3 10*3/MM3 (ref 3.4–10.8)

## 2024-01-13 PROCEDURE — 99232 SBSQ HOSP IP/OBS MODERATE 35: CPT | Performed by: INTERNAL MEDICINE

## 2024-01-13 PROCEDURE — 81001 URINALYSIS AUTO W/SCOPE: CPT | Performed by: INTERNAL MEDICINE

## 2024-01-13 PROCEDURE — 84100 ASSAY OF PHOSPHORUS: CPT | Performed by: INTERNAL MEDICINE

## 2024-01-13 PROCEDURE — 25010000002 CEFTRIAXONE PER 250 MG: Performed by: INTERNAL MEDICINE

## 2024-01-13 PROCEDURE — 83735 ASSAY OF MAGNESIUM: CPT | Performed by: INTERNAL MEDICINE

## 2024-01-13 PROCEDURE — 80048 BASIC METABOLIC PNL TOTAL CA: CPT | Performed by: INTERNAL MEDICINE

## 2024-01-13 PROCEDURE — 85027 COMPLETE CBC AUTOMATED: CPT | Performed by: INTERNAL MEDICINE

## 2024-01-13 RX ADMIN — POLYETHYLENE GLYCOL 3350 17 G: 17 POWDER, FOR SOLUTION ORAL at 20:47

## 2024-01-13 RX ADMIN — CLONAZEPAM 2 MG: 0.5 TABLET ORAL at 20:47

## 2024-01-13 RX ADMIN — POLYETHYLENE GLYCOL 3350 17 G: 17 POWDER, FOR SOLUTION ORAL at 09:18

## 2024-01-13 RX ADMIN — CEFTRIAXONE SODIUM 1000 MG: 1 INJECTION, POWDER, FOR SOLUTION INTRAMUSCULAR; INTRAVENOUS at 00:59

## 2024-01-13 RX ADMIN — DOCUSATE SODIUM 50MG AND SENNOSIDES 8.6MG 1 TABLET: 8.6; 5 TABLET, FILM COATED ORAL at 20:47

## 2024-01-13 RX ADMIN — DOCUSATE SODIUM 50MG AND SENNOSIDES 8.6MG 1 TABLET: 8.6; 5 TABLET, FILM COATED ORAL at 09:18

## 2024-01-13 RX ADMIN — POLYETHYLENE GLYCOL 3350 17 G: 17 POWDER, FOR SOLUTION ORAL at 01:34

## 2024-01-13 RX ADMIN — Medication 10 ML: at 20:47

## 2024-01-13 RX ADMIN — Medication 5 MG: at 20:47

## 2024-01-13 RX ADMIN — POLYETHYLENE GLYCOL 3350 17 G: 17 POWDER, FOR SOLUTION ORAL at 13:53

## 2024-01-13 RX ADMIN — Medication 10 ML: at 09:19

## 2024-01-13 NOTE — PROGRESS NOTES
[facility]   Hospitalist Progress Note  Date: 2024  Patient Name: Luis Evans  : 1970  MRN: [id number]  Date of admission: 1/10/2024  Room/Bed: [facility]/      Subjective   Subjective     Chief Complaint:   Urinary retention, constipation    Summary:    Luis Evans is a 53 y.o. male with a past medical history significant for cognitive impairment, nonverbal at baseline.  Able to meet with patient's mother at bedside.  She has concerns for urinary retention as well as constipation.  Patient has been with decreasing oral intake over the past few weeks.  Patient without a regular bowel movement for a couple of weeks.  Patient's mother states he has tried going to the bathroom straining however with no bowel movement.  Also having difficulty with urination.  In the ED patient required straight catheterization, bladder scan showed greater than 9 9 9 mL of urine.  KUB obtained showing a large stool burden.  Follow-up CT scan of abdomen shows an extremely large pancolonic stool burden with likely fecal impaction within the rectum.  Patient has bilateral hydronephrosis and hydroureter.  Labs were significant for a potassium of 3, creatinine of 1.3, no baseline creatinine on record.  Patient has a white count of 13.  UA concerning for urinary tract infection.  Patient also found to be febrile with a Tmax of 39.1 in the ED.  Hospitalist contacted for admission.     Interval Followup:   Discussed with nursing staff.  Patient with 2 very large bowel movements with very firm contents.  Suspecting this is the majority of stool resulting in fecal impaction.  Garner catheter will be removed today, will monitor with bladder scans to ensure patient is able to void on his own.  Patient remains afebrile.  Will continue to monitor stool output through the day to ensure patient is still making good bowel movements    Objective   Objective     Vitals:   Temp:  [97.3 °F (36.3 °C)-97.7 °F (36.5 °C)] 97.6 °F (36.4  °C)  Heart Rate:  [107-114] 109  Resp:  [16-18] 16  BP: (116-143)/(66-80) 116/67    Physical Exam               Constitutional: Sleeping, wakes to voice, resting comfortably              Eyes: Pupils equal, sclerae anicteric, no conjunctival injection              HENT: NCAT, mucous membranes moist              Neck: Supple, no thyromegaly, no lymphadenopathy, trachea midline              Respiratory: Clear to auscultation bilaterally, nonlabored respirations               Cardiovascular: RRR, no murmurs, rubs, or gallops, palpable pedal pulses bilaterally              Gastrointestinal: Abdomen soft, nondistended              Musculoskeletal: Trace bilateral ankle edema, no clubbing or cyanosis to extremities              Neurologic: Sleeping but wakes to voice, nonverbal, observed moving all 4 extremities    Result Review    Result Review:  I have personally reviewed these results:  [x]  Laboratory      Lab 01/13/24  0551 01/12/24  1115 01/11/24  0519 01/10/24  0728 01/10/24  0504   WBC 8.30 9.39 13.42*  --  13.44*   HEMOGLOBIN 10.5* 10.9* 11.0*  --  11.5*   HEMATOCRIT 31.6* 34.5* 32.7*  --  34.4*   PLATELETS 230 206 171  --  159   NEUTROS ABS  --   --  10.07*  --  10.24*   IMMATURE GRANS (ABS)  --   --  0.07*  --  0.05   LYMPHS ABS  --   --  1.09  --  1.03   MONOS ABS  --   --  2.16*  --  2.03*   EOS ABS  --   --  0.00  --  0.05   MCV 87.5 91.8 86.5  --  87.1   LACTATE  --   --   --  1.6  --          Lab 01/13/24  0551 01/12/24  1115 01/11/24  0519   SODIUM 139 137 137   POTASSIUM 3.9 4.1 3.4*   CHLORIDE 103 101 100   CO2 27.3 27.0 26.7   ANION GAP 8.7 9.0 10.3   BUN 8 10 12   CREATININE 1.01 1.06 1.14   EGFR 89.5 84.4 77.4   GLUCOSE 130* 160* 132*   CALCIUM 9.0 8.8 8.6   MAGNESIUM 2.0 2.2 2.2   PHOSPHORUS 3.4 2.3* 3.0         Lab 01/11/24  0519 01/10/24  0504   TOTAL PROTEIN 7.0 7.3   ALBUMIN 3.1* 3.4*   GLOBULIN 3.9 3.9   ALT (SGPT) 13 7   AST (SGOT) 19 17   BILIRUBIN 0.8 1.0   ALK PHOS 65 68         Lab  01/10/24  0504   HSTROP T 13                 Brief Urine Lab Results  (Last result in the past 365 days)        Color   Clarity   Blood   Leuk Est   Nitrite   Protein   CREAT   Urine HCG        01/10/24 0549 Yellow   Clear   Trace   Small (1+)   Positive   Trace                 [x]  Microbiology   Microbiology Results (last 10 days)       Procedure Component Value - Date/Time    Urine Culture - Urine, Indwelling Urethral Catheter [[id number]]  (Abnormal) Collected: 01/10/24 0729    Lab Status: Final result Specimen: Urine from Indwelling Urethral Catheter Updated: 01/12/24 0420     Urine Culture >100,000 CFU/mL Staphylococcus, coagulase negative    Narrative:      Colonization of the urinary tract without infection is common. Treatment is discouraged unless the patient is symptomatic, pregnant, or undergoing an invasive urologic procedure.  By laboratory criteria, additional testing is not indicated and unlikely to produce clinically relevant information. Coagulase negative staphylococci are common skin contaminants, members of the oral geraldo, and of low virulence; requires clinical correlation.    Blood Culture - Blood, Arm, Left [[id number]]  (Normal) Collected: 01/10/24 0728    Lab Status: Preliminary result Specimen: Blood from Arm, Left Updated: 01/13/24 0746     Blood Culture No growth at 3 days    Blood Culture - Blood, Arm, Left [[id number]]  (Normal) Collected: 01/10/24 0728    Lab Status: Preliminary result Specimen: Blood from Arm, Left Updated: 01/13/24 0746     Blood Culture No growth at 3 days    Rapid Strep A Screen - Swab, Throat [[id number]]  (Normal) Collected: 01/09/24 2243    Lab Status: Final result Specimen: Swab from Throat Updated: 01/09/24 2314     Strep A Ag Negative    COVID-19, FLU A/B, RSV PCR 1 HR TAT - Swab, Nasopharynx [[id number]]  (Normal) Collected: 01/09/24 2243    Lab Status: Final result Specimen: Swab from Nasopharynx Updated: 01/10/24 0009     COVID19 Not Detected     Influenza A  PCR Not Detected     Influenza B PCR Not Detected     RSV, PCR Not Detected    Narrative:      Fact sheet for providers: https://www.fda.gov/media/401316/download    Fact sheet for patients: https://www.fda.gov/media/265970/download    Test performed by PCR.    Beta Strep Culture, Throat - Swab, Throat [[id number]]  (Normal) Collected: 01/09/24 2243    Lab Status: Final result Specimen: Swab from Throat Updated: 01/12/24 0750     Throat Culture, Beta Strep No Beta Hemolytic Streptococcus Isolated    Narrative:      Group A Strep incidence is low in adults. Positive culture for Beta hemolytic Streptococcus species can reflect colonization and not true infection. Please correlate clinically.          [x]  Radiology  CT Abdomen Pelvis With Contrast    Result Date: 1/10/2024    1. Extremely large pancolonic stool burden with likely fecal impaction within the rectum. 2. Distal esophageal wall thickening and small hiatal hernia.  Findings likely reflect reflux. 3. Bilateral hydronephrosis and hydroureter.  Bladder is partially decompressed with Garner.  Circumferential bladder wall thickening likely related to cystitis or neurogenic bladder.      DESTINY LAMB MD       Electronically Signed and Approved By: DESTINY LAMB MD on 1/10/2024 at 7:15             CT Chest With Contrast Diagnostic    Result Date: 1/10/2024    1. No evidence of pulmonary embolism within the main or lobar pulmonary arteries.  Limited assessment of the segmental and subsegmental branches secondary to breathing motion artifact. 2. Mild dependent positional atelectasis. 3. Esophageal wall thickening involving the mid and distal esophagus.  Suspect esophagitis.  Correlate clinically for upper GI symptoms with consideration for GI follow-up.     DESTINY LAMB MD       Electronically Signed and Approved By: DESTINY LAMB MD on 1/10/2024 at 7:09             XR Abdomen 1 View    Result Date: 1/10/2024   A large stool burden is suggested  radiographically.  There is possible constipation.     Please note that portions of this note were completed with a voice recognition program.  COURTNEY JONES JR, MD       Electronically Signed and Approved By: COURTNEY JONES JR, MD on 1/10/2024 at 1:46              XR Chest 1 View    Result Date: 1/10/2024   There is pulmonary hypoinflation.  There may be a small right pleural effusion.  No definite focal infiltrate.  No cardiomediastinal enlargement.  No pneumothorax.  There may be a large stool burden.  Please see above comments for further detail.      Please note that portions of this note were completed with a voice recognition program.  COURTNEY JONES JR, MD       Electronically Signed and Approved By: COURTNEY JONES JR, MD on 1/10/2024 at 1:44             []  EKG/Telemetry   []  Cardiology/Vascular   []  Pathology  []  Old records  []  Other:    Assessment & Plan   Assessment / Plan     Assessment:  Fecal impaction  Constipation  Cognitive impairment, nonverbal at baseline  Urinary retention  UTI     Plan:  Patient remains admitted for further care and management  Patient now with 2 very large bowel movements likely the large amount seen on CT scan.  Will continue on regiment through the day to encourage continued good bowel movements  Potassium and magnesium appropriate today  Continue Tori-Colace and additional MiraLAX  Garner discontinued today, repeat UA ordered however still not been collected  Coag negative staph on UA therefore repeating, will continue antibiotics for now  Will monitor electrolytes and renal function with BMP and magnesium level in the AM  Will monitor WBC and Hgb with CBC in the AM  Clinical course will dictate further management         Part of this note may be an electronic transcription/translation of spoken language to printed text using the Dragon dictation system.       Discussed with RN.  Family at bedside    DVT prophylaxis:  Mechanical DVT prophylaxis orders are  present.    CODE STATUS:   Code Status (Patient has no pulse and is not breathing): CPR (Attempt to Resuscitate)  Medical Interventions (Patient has pulse or is breathing): Full Support

## 2024-01-13 NOTE — PLAN OF CARE
Goal Outcome Evaluation:      No significant change in patient's status on shift. Multiple small loose BM on shift. Family remained at bedside. Plan of care ongoing.

## 2024-01-14 ENCOUNTER — READMISSION MANAGEMENT (OUTPATIENT)
Dept: CALL CENTER | Facility: HOSPITAL | Age: 54
End: 2024-01-14
Payer: MEDICARE

## 2024-01-14 VITALS
DIASTOLIC BLOOD PRESSURE: 64 MMHG | HEART RATE: 81 BPM | HEIGHT: 68 IN | SYSTOLIC BLOOD PRESSURE: 114 MMHG | BODY MASS INDEX: 23.62 KG/M2 | WEIGHT: 155.87 LBS | RESPIRATION RATE: 18 BRPM | TEMPERATURE: 97.3 F | OXYGEN SATURATION: 99 %

## 2024-01-14 LAB
ANION GAP SERPL CALCULATED.3IONS-SCNC: 7.6 MMOL/L (ref 5–15)
BUN SERPL-MCNC: 8 MG/DL (ref 6–20)
BUN/CREAT SERPL: 8.2 (ref 7–25)
CALCIUM SPEC-SCNC: 9.1 MG/DL (ref 8.6–10.5)
CHLORIDE SERPL-SCNC: 103 MMOL/L (ref 98–107)
CO2 SERPL-SCNC: 26.4 MMOL/L (ref 22–29)
CREAT SERPL-MCNC: 0.97 MG/DL (ref 0.76–1.27)
DEPRECATED RDW RBC AUTO: 37.2 FL (ref 37–54)
EGFRCR SERPLBLD CKD-EPI 2021: 93.9 ML/MIN/1.73
ERYTHROCYTE [DISTWIDTH] IN BLOOD BY AUTOMATED COUNT: 11.6 % (ref 12.3–15.4)
GLUCOSE SERPL-MCNC: 104 MG/DL (ref 65–99)
HCT VFR BLD AUTO: 32.6 % (ref 37.5–51)
HGB BLD-MCNC: 10.7 G/DL (ref 13–17.7)
MAGNESIUM SERPL-MCNC: 2 MG/DL (ref 1.6–2.6)
MCH RBC QN AUTO: 28.9 PG (ref 26.6–33)
MCHC RBC AUTO-ENTMCNC: 32.8 G/DL (ref 31.5–35.7)
MCV RBC AUTO: 88.1 FL (ref 79–97)
PLATELET # BLD AUTO: 257 10*3/MM3 (ref 140–450)
PMV BLD AUTO: 10.6 FL (ref 6–12)
POTASSIUM SERPL-SCNC: 4.3 MMOL/L (ref 3.5–5.2)
RBC # BLD AUTO: 3.7 10*6/MM3 (ref 4.14–5.8)
SODIUM SERPL-SCNC: 137 MMOL/L (ref 136–145)
WBC NRBC COR # BLD AUTO: 6.67 10*3/MM3 (ref 3.4–10.8)

## 2024-01-14 PROCEDURE — 80048 BASIC METABOLIC PNL TOTAL CA: CPT | Performed by: INTERNAL MEDICINE

## 2024-01-14 PROCEDURE — 25010000002 CEFTRIAXONE PER 250 MG: Performed by: INTERNAL MEDICINE

## 2024-01-14 PROCEDURE — 83735 ASSAY OF MAGNESIUM: CPT | Performed by: INTERNAL MEDICINE

## 2024-01-14 PROCEDURE — 99239 HOSP IP/OBS DSCHRG MGMT >30: CPT | Performed by: INTERNAL MEDICINE

## 2024-01-14 PROCEDURE — 85027 COMPLETE CBC AUTOMATED: CPT | Performed by: INTERNAL MEDICINE

## 2024-01-14 RX ORDER — POLYETHYLENE GLYCOL 3350 17 G/17G
17 POWDER, FOR SOLUTION ORAL DAILY
Qty: 850 G | Refills: 1 | Status: SHIPPED | OUTPATIENT
Start: 2024-01-14

## 2024-01-14 RX ADMIN — POLYETHYLENE GLYCOL 3350 17 G: 17 POWDER, FOR SOLUTION ORAL at 01:01

## 2024-01-14 RX ADMIN — CEFTRIAXONE SODIUM 1000 MG: 1 INJECTION, POWDER, FOR SOLUTION INTRAMUSCULAR; INTRAVENOUS at 00:58

## 2024-01-14 NOTE — OUTREACH NOTE
Prep Survey      Flowsheet Row Responses   [facility] patient discharged from? [facility]   Is LACE score < 7 ? No   Eligibility [facility]   Date of Admission 01/10/24   Date of Discharge 01/14/24   Discharge Disposition Home or Self Care   Discharge diagnosis Fecal impaction   Does the patient have one of the following disease processes/diagnoses(primary or secondary)? Other   Does the patient have Home health ordered? No   Is there a DME ordered? No   Prep survey completed? Yes            DOMI ESTRADA - Registered Nurse

## 2024-01-14 NOTE — DISCHARGE SUMMARY
[facility]         HOSPITALIST  DISCHARGE SUMMARY    Patient Name: Luis Evans  : 1970  MRN: [id number]    Date of Admission: 1/10/2024  Date of Discharge:  2024  Primary Care Physician: Leonardo Barraza Jr., MD    Active and Resolved Hospital Problems:  Fecal impaction  History of constipation  Cognitive impairment, nonverbal at baseline  Urinary retention, due to constipation  UTI, grew coag negative staph    Hospital Course     Hospital Course:  Luis Evans is a 53 y.o. male with a past medical history significant for cognitive impairment, nonverbal at baseline.  Able to meet with patient's mother at bedside.  She has concerns for urinary retention as well as constipation.  Patient has been with decreasing oral intake over the past few weeks.  Patient without a regular bowel movement for a couple of weeks.  Patient's mother states he has tried going to the bathroom straining however with no bowel movement.  Also having difficulty with urination.  In the ED patient required straight catheterization, bladder scan showed greater than 999 mL of urine.  KUB obtained showing a large stool burden.  Follow-up CT scan of abdomen shows an extremely large pancolonic stool burden with likely fecal impaction within the rectum.  Patient has bilateral hydronephrosis and hydroureter.  Labs were significant for a potassium of 3, creatinine of 1.3, no baseline creatinine on record.  Patient has a white count of 13.  UA concerning for urinary tract infection.  Patient also found to be febrile with a Tmax of 39.1 in the ED.  Hospitalist contacted for admission.  Patient was started on antibiotics for concern of urinary tract infection however returned as coag negative staph therefore not discharged on further antibiotics.  However due to retention a Garner catheter was initially placed, this was later removed during hospitalization with patient able to urinate.  Patient was provided both  oral and TN medications to help with constipation.  Patient finally started having very large bowel movements.  Patient will be discharged with instruction to maintain fiber supplementation daily.  Family will try and promote good daily routine around bowel movements with patient.  Encouraged the use of increasing oral regimen versus suppository/enema in the setting of several days without bowel movement.  Patient seen on date of discharge, clinically and hemodynamically stable.  Patient provided concerning signs and symptoms prompting immediate medical attention, patient understanding and agreeable       DISCHARGE Follow Up Recommendations for labs and diagnostics:   Follow-up with PCP soon as possible      Day of Discharge     Vital Signs:  Temp:  [97.2 °F (36.2 °C)-98.2 °F (36.8 °C)] 97.4 °F (36.3 °C)  Heart Rate:  [] 77  Resp:  [16-18] 16  BP: (101-134)/(57-74) 108/61  Physical Exam:               Constitutional: Sleeping, wakes to voice, resting comfortably              Eyes: Pupils equal, sclerae anicteric, no conjunctival injection              HENT: NCAT, mucous membranes moist              Neck: Supple, no thyromegaly, no lymphadenopathy, trachea midline              Respiratory: Clear to auscultation bilaterally, nonlabored respirations               Cardiovascular: RRR, no murmurs, rubs, or gallops, palpable pedal pulses bilaterally              Gastrointestinal: Abdomen soft, nondistended              Musculoskeletal: Trace bilateral ankle edema, no clubbing or cyanosis to extremities              Neurologic: Sleeping but wakes to voice, nonverbal, observed moving all 4 extremities    Discharge Details        Discharge Medications        Changes to Medications        Instructions Start Date   polyethylene glycol 17 GM/SCOOP powder  Commonly known as: MIRALAX  What changed:   when to take this  reasons to take this   17 g, Oral, Daily             Continue These Medications        Instructions Start  Date   clonazePAM 2 MG tablet  Commonly known as: KlonoPIN   2 mg, Oral, Every Night at Bedtime      glycopyrrolate 2 MG tablet  Commonly known as: ROBINUL   4 mg, Oral, 2 Times Daily      Melatonin 3 MG capsule   3 mg, Oral, Nightly      meloxicam 15 MG tablet  Commonly known as: MOBIC   15 mg, Oral, Daily PRN               No Known Allergies    Discharge Disposition:  Home or Self Care    Diet:  Hospital:  Diet Order   Procedures    Diet: Regular/House Diet; Texture: Soft to Chew (NDD 3); Soft to Chew: Ground Meat; Fluid Consistency: Thin (IDDSI 0)       Discharge Activity:   Activity Instructions       Activity as Tolerated              CODE STATUS:  Code Status and Medical Interventions:   Ordered at: 01/10/24 0832     Code Status (Patient has no pulse and is not breathing):    CPR (Attempt to Resuscitate)     Medical Interventions (Patient has pulse or is breathing):    Full Support         Future Appointments   Date Time Provider Department Center   3/1/2024  1:45 PM Marquita Knowles APRN [facility] [place] [facility]   4/8/2024 11:15 AM Leonarod Barraza Jr., MD [facility]       Additional Instructions for the Follow-ups that You Need to Schedule       Discharge Follow-up with PCP   As directed       Currently Documented PCP:    Leonardo Barraza Jr., MD    PCP Phone Number:    [phone]     Follow Up Details: In less than one week                Pertinent  and/or Most Recent Results     PROCEDURES:   NA    LAB RESULTS:      Lab 01/14/24  0522 01/13/24  0551 01/12/24  1115 01/11/24  0519 01/10/24  0728 01/10/24  0504   WBC 6.67 8.30 9.39 13.42*  --  13.44*   HEMOGLOBIN 10.7* 10.5* 10.9* 11.0*  --  11.5*   HEMATOCRIT 32.6* 31.6* 34.5* 32.7*  --  34.4*   PLATELETS 257 230 206 171  --  159   NEUTROS ABS  --   --   --  10.07*  --  10.24*   IMMATURE GRANS (ABS)  --   --   --  0.07*  --  0.05   LYMPHS ABS  --   --   --  1.09  --  1.03   MONOS ABS  --   --   --  2.16*  --  2.03*   EOS ABS  --   --   --  0.00  --   0.05   MCV 88.1 87.5 91.8 86.5  --  87.1   LACTATE  --   --   --   --  1.6  --          Lab 01/14/24  0522 01/13/24  0551 01/12/24  1115 01/11/24  0519 01/10/24  0504   SODIUM 137 139 137 137 137   POTASSIUM 4.3 3.9 4.1 3.4* 3.0*   CHLORIDE 103 103 101 100 99   CO2 26.4 27.3 27.0 26.7 21.4*   ANION GAP 7.6 8.7 9.0 10.3 16.6*   BUN 8 8 10 12 15   CREATININE 0.97 1.01 1.06 1.14 1.32*   EGFR 93.9 89.5 84.4 77.4 64.9   GLUCOSE 104* 130* 160* 132* 126*   CALCIUM 9.1 9.0 8.8 8.6 8.6   MAGNESIUM 2.0 2.0 2.2 2.2 2.3   PHOSPHORUS  --  3.4 2.3* 3.0  --          Lab 01/11/24  0519 01/10/24  0504   TOTAL PROTEIN 7.0 7.3   ALBUMIN 3.1* 3.4*   GLOBULIN 3.9 3.9   ALT (SGPT) 13 7   AST (SGOT) 19 17   BILIRUBIN 0.8 1.0   ALK PHOS 65 68         Lab 01/10/24  0504   HSTROP T 13                 Brief Urine Lab Results  (Last result in the past 365 days)        Color   Clarity   Blood   Leuk Est   Nitrite   Protein   CREAT   Urine HCG        01/13/24 1000 Yellow   Clear   Moderate (2+)   Trace   Negative   >=300 mg/dL (3+)                 Microbiology Results (last 10 days)       Procedure Component Value - Date/Time    Urine Culture - Urine, Indwelling Urethral Catheter [[id number]]  (Abnormal) Collected: 01/10/24 0729    Lab Status: Final result Specimen: Urine from Indwelling Urethral Catheter Updated: 01/12/24 0420     Urine Culture >100,000 CFU/mL Staphylococcus, coagulase negative    Narrative:      Colonization of the urinary tract without infection is common. Treatment is discouraged unless the patient is symptomatic, pregnant, or undergoing an invasive urologic procedure.  By laboratory criteria, additional testing is not indicated and unlikely to produce clinically relevant information. Coagulase negative staphylococci are common skin contaminants, members of the oral geraldo, and of low virulence; requires clinical correlation.    Blood Culture - Blood, Arm, Left [[id number]]  (Normal) Collected: 01/10/24 5241    Lab Status:  Preliminary result Specimen: Blood from Arm, Left Updated: 01/14/24 0745     Blood Culture No growth at 4 days    Blood Culture - Blood, Arm, Left [[id number]]  (Normal) Collected: 01/10/24 0728    Lab Status: Preliminary result Specimen: Blood from Arm, Left Updated: 01/14/24 0745     Blood Culture No growth at 4 days    Rapid Strep A Screen - Swab, Throat [[id number]]  (Normal) Collected: 01/09/24 2243    Lab Status: Final result Specimen: Swab from Throat Updated: 01/09/24 2314     Strep A Ag Negative    COVID-19, FLU A/B, RSV PCR 1 HR TAT - Swab, Nasopharynx [[id number]]  (Normal) Collected: 01/09/24 2243    Lab Status: Final result Specimen: Swab from Nasopharynx Updated: 01/10/24 0009     COVID19 Not Detected     Influenza A PCR Not Detected     Influenza B PCR Not Detected     RSV, PCR Not Detected    Narrative:      Fact sheet for providers: https://www.fda.gov/media/656613/download    Fact sheet for patients: https://www.fda.gov/media/921350/download    Test performed by PCR.    Beta Strep Culture, Throat - Swab, Throat [[id number]]  (Normal) Collected: 01/09/24 2243    Lab Status: Final result Specimen: Swab from Throat Updated: 01/12/24 0750     Throat Culture, Beta Strep No Beta Hemolytic Streptococcus Isolated    Narrative:      Group A Strep incidence is low in adults. Positive culture for Beta hemolytic Streptococcus species can reflect colonization and not true infection. Please correlate clinically.            CT Abdomen Pelvis With Contrast    Result Date: 1/10/2024  Impression:   1. Extremely large pancolonic stool burden with likely fecal impaction within the rectum. 2. Distal esophageal wall thickening and small hiatal hernia.  Findings likely reflect reflux. 3. Bilateral hydronephrosis and hydroureter.  Bladder is partially decompressed with Garner.  Circumferential bladder wall thickening likely related to cystitis or neurogenic bladder.      DESTINY LAMB MD       Electronically Signed and  Approved By: DESTINY LAMB MD on 1/10/2024 at 7:15             CT Chest With Contrast Diagnostic    Result Date: 1/10/2024  Impression:   1. No evidence of pulmonary embolism within the main or lobar pulmonary arteries.  Limited assessment of the segmental and subsegmental branches secondary to breathing motion artifact. 2. Mild dependent positional atelectasis. 3. Esophageal wall thickening involving the mid and distal esophagus.  Suspect esophagitis.  Correlate clinically for upper GI symptoms with consideration for GI follow-up.     DESTINY LAMB MD       Electronically Signed and Approved By: DESTINY LAMB MD on 1/10/2024 at 7:09             XR Abdomen 1 View    Result Date: 1/10/2024  Impression:  A large stool burden is suggested radiographically.  There is possible constipation.     Please note that portions of this note were completed with a voice recognition program.  COURTNEY JONES JR, MD       Electronically Signed and Approved By: COURTNEY JONES JR, MD on 1/10/2024 at 1:46              XR Chest 1 View    Result Date: 1/10/2024  Impression:  There is pulmonary hypoinflation.  There may be a small right pleural effusion.  No definite focal infiltrate.  No cardiomediastinal enlargement.  No pneumothorax.  There may be a large stool burden.  Please see above comments for further detail.      Please note that portions of this note were completed with a voice recognition program.  COURTNEY JONES JR, MD       Electronically Signed and Approved By: COURTNEY JONES JR, MD on 1/10/2024 at 1:44                           Labs Pending at Discharge:  Pending Labs       Order Current Status    Blood Culture - Blood, Arm, Left Preliminary result    Blood Culture - Blood, Arm, Left Preliminary result              Time spent on Discharge including face to face service:  36 minutes    Electronically signed by Walter Osman MD, 01/14/24, 9:43 AM EST.

## 2024-01-14 NOTE — PLAN OF CARE
Goal Outcome Evaluation:      Patient alert on shift, nonverbal. VSS. Family remained at bedside. Patient had 1 large firm BM on shift, several small loose BM. Medicated per MAR. Plan of care continue.

## 2024-01-15 ENCOUNTER — TRANSITIONAL CARE MANAGEMENT TELEPHONE ENCOUNTER (OUTPATIENT)
Dept: CALL CENTER | Facility: HOSPITAL | Age: 54
End: 2024-01-15
Payer: MEDICARE

## 2024-01-15 LAB
BACTERIA SPEC AEROBE CULT: NORMAL
BACTERIA SPEC AEROBE CULT: NORMAL

## 2024-01-15 NOTE — OUTREACH NOTE
Call Center TCM Note      Flowsheet Row Responses   [facility] patient discharged from? [facility]   Does the patient have one of the following disease processes/diagnoses(primary or secondary)? Other   TCM attempt successful? No   Unsuccessful attempts Attempt 2            Ary Bazzi RN    1/15/2024, 13:37 EST

## 2024-01-15 NOTE — OUTREACH NOTE
Call Center TCM Note      Flowsheet Row Responses   [facility] patient discharged from? [facility]   Does the patient have one of the following disease processes/diagnoses(primary or secondary)? Other   TCM attempt successful? No  [no updated verbal release on file, notes indicate that mother active with POC during admission]   Unsuccessful attempts Attempt 1            Ary Bazzi RN    1/15/2024, 08:22 EST

## 2024-01-16 ENCOUNTER — TRANSITIONAL CARE MANAGEMENT TELEPHONE ENCOUNTER (OUTPATIENT)
Dept: CALL CENTER | Facility: HOSPITAL | Age: 54
End: 2024-01-16
Payer: MEDICARE

## 2024-01-16 NOTE — OUTREACH NOTE
Call Center TCM Note      Flowsheet Row Responses   [facility] patient discharged from? [facility]   Does the patient have one of the following disease processes/diagnoses(primary or secondary)? Other   TCM attempt successful? No  [no updated verbal release,  notes inidcate mother involved with POC during admission]   Unsuccessful attempts Attempt 3            Ary Bazzi RN    1/16/2024, 08:26 EST

## 2024-01-28 DIAGNOSIS — F41.9 ANXIETY: ICD-10-CM

## 2024-01-29 RX ORDER — CLONAZEPAM 2 MG/1
2 TABLET ORAL
Qty: 30 TABLET | Refills: 0 | Status: SHIPPED | OUTPATIENT
Start: 2024-01-29

## 2024-01-29 RX ORDER — IBUPROFEN/PSEUDOEPHEDRINE HCL 200MG-30MG
1 TABLET ORAL NIGHTLY
Qty: 90 TABLET | OUTPATIENT
Start: 2024-01-29

## 2024-01-29 NOTE — TELEPHONE ENCOUNTER
Refill request for controlled substance.      Date of request: 1/29/2024   Medication requested: Klonopin (Clonazepam)  Last OV: 11/30/23  Last UDS: 5/18/23  Contract signed: yes    Date:5/18/23  Next office visit: 4/8/24    Adelina Bazzi

## 2024-02-20 ENCOUNTER — TELEPHONE (OUTPATIENT)
Dept: GASTROENTEROLOGY | Facility: CLINIC | Age: 54
End: 2024-02-20
Payer: MEDICARE

## 2024-02-20 NOTE — TELEPHONE ENCOUNTER
Call placed to patient in regard to his insurance.  is not currently accepting [employer] HMO plans.   We need verification if he is still carrying the [employer] or has a new insurance.   Left message on  for call back

## 2024-03-18 DIAGNOSIS — F41.9 ANXIETY: ICD-10-CM

## 2024-03-18 RX ORDER — CLONAZEPAM 2 MG/1
2 TABLET ORAL
Qty: 30 TABLET | Refills: 0 | Status: SHIPPED | OUTPATIENT
Start: 2024-03-18

## 2024-03-18 NOTE — TELEPHONE ENCOUNTER
Refill request for controlled substance.      Date of request: 3/18/2024    Medication requested: Klonopin (Clonazepam)  Last OV: 11/30/23  Last UDS: 5/18/23  Contract signed: yes    Date:5/18/23  Next office visit: yennied    Adelina Bazzi

## 2024-03-25 ENCOUNTER — PRIOR AUTHORIZATION (OUTPATIENT)
Dept: INTERNAL MEDICINE | Facility: CLINIC | Age: 54
End: 2024-03-25
Payer: MEDICARE

## 2024-03-27 NOTE — TELEPHONE ENCOUNTER
Approved      Request Reference Number: [id number]. GLYCOPYRROL TAB 2MG is approved through 12/31/2024.

## 2024-04-23 ENCOUNTER — TELEPHONE (OUTPATIENT)
Dept: INTERNAL MEDICINE | Facility: CLINIC | Age: 54
End: 2024-04-23
Payer: MEDICARE

## 2024-04-23 NOTE — TELEPHONE ENCOUNTER
Caller: DORA MEDINA    Relationship: Mother    Best call back number: [phone]       What was the call regarding:       THE PATIENT'S MOTHER SAID SHE RECEIVED  A MESSAGE FROM PCP WANTING TO KNOW WHY HIS APPOINTMENT WAS CANCELLED. SHE SAID SHE CANCELLED IT DUE TO THE INSURANCE AND [facility] SEPARATION.

## 2024-08-04 DIAGNOSIS — K11.7 DROOLING: ICD-10-CM

## 2024-08-05 RX ORDER — GLYCOPYRROLATE 2 MG/1
4 TABLET ORAL 2 TIMES DAILY
Qty: 360 TABLET | Refills: 3 | Status: SHIPPED | OUTPATIENT
Start: 2024-08-05

## 2025-03-06 DIAGNOSIS — F41.9 ANXIETY: ICD-10-CM

## 2025-03-06 RX ORDER — RISPERIDONE 2 MG/1
2 TABLET ORAL DAILY
Qty: 90 TABLET | Refills: 3 | OUTPATIENT
Start: 2025-03-06

## 2025-03-20 ENCOUNTER — PRIOR AUTHORIZATION (OUTPATIENT)
Dept: INTERNAL MEDICINE | Facility: CLINIC | Age: 55
End: 2025-03-20
Payer: MEDICARE